# Patient Record
Sex: MALE | Race: WHITE | Employment: FULL TIME | URBAN - METROPOLITAN AREA
[De-identification: names, ages, dates, MRNs, and addresses within clinical notes are randomized per-mention and may not be internally consistent; named-entity substitution may affect disease eponyms.]

---

## 2019-09-29 ENCOUNTER — HOSPITAL ENCOUNTER (EMERGENCY)
Age: 64
Discharge: HOME | End: 2019-09-29
Attending: STUDENT IN AN ORGANIZED HEALTH CARE EDUCATION/TRAINING PROGRAM
Payer: COMMERCIAL

## 2019-09-29 VITALS
DIASTOLIC BLOOD PRESSURE: 81 MMHG | SYSTOLIC BLOOD PRESSURE: 154 MMHG | OXYGEN SATURATION: 98 % | HEART RATE: 79 BPM | RESPIRATION RATE: 16 BRPM | HEIGHT: 73 IN | WEIGHT: 229.06 LBS | BODY MASS INDEX: 30.36 KG/M2 | TEMPERATURE: 98 F

## 2019-09-29 DIAGNOSIS — S05.02XA ABRASION OF LEFT CORNEA, INITIAL ENCOUNTER: Primary | ICD-10-CM

## 2019-09-29 PROCEDURE — 74011000250 HC RX REV CODE- 250: Performed by: STUDENT IN AN ORGANIZED HEALTH CARE EDUCATION/TRAINING PROGRAM

## 2019-09-29 PROCEDURE — 99283 EMERGENCY DEPT VISIT LOW MDM: CPT

## 2019-09-29 RX ORDER — OXYCODONE AND ACETAMINOPHEN 5; 325 MG/1; MG/1
1 TABLET ORAL
Qty: 8 TAB | Refills: 0 | Status: SHIPPED | OUTPATIENT
Start: 2019-09-29 | End: 2019-10-02

## 2019-09-29 RX ORDER — TETRACAINE HYDROCHLORIDE 5 MG/ML
1 SOLUTION OPHTHALMIC
Status: COMPLETED | OUTPATIENT
Start: 2019-09-29 | End: 2019-09-29

## 2019-09-29 RX ORDER — TETRACAINE HYDROCHLORIDE 5 MG/ML
1 SOLUTION OPHTHALMIC
Status: DISCONTINUED | OUTPATIENT
Start: 2019-09-29 | End: 2019-09-29

## 2019-09-29 RX ORDER — ERYTHROMYCIN 5 MG/G
OINTMENT OPHTHALMIC
Qty: 1 TUBE | Refills: 0 | Status: SHIPPED | OUTPATIENT
Start: 2019-09-29 | End: 2019-10-06

## 2019-09-29 RX ORDER — PROPARACAINE HYDROCHLORIDE 5 MG/ML
1 SOLUTION/ DROPS OPHTHALMIC
Status: DISCONTINUED | OUTPATIENT
Start: 2019-09-29 | End: 2019-09-29

## 2019-09-29 RX ADMIN — TETRACAINE HYDROCHLORIDE 1 DROP: 5 SOLUTION OPHTHALMIC at 10:47

## 2019-09-29 RX ADMIN — FLUORESCEIN SODIUM 1 STRIP: 1 STRIP OPHTHALMIC at 10:46

## 2019-09-29 NOTE — ED NOTES
Patient was discharged and given instructions by Dr. Ana Lilia Rao. Patient verbalized good understanding of all discharge instructions, prescriptions and f/u care. All questions answered. Pt in stable condition on discharge.

## 2019-09-29 NOTE — ED PROVIDER NOTES
The history is provided by the patient and the spouse. Eye Pain    This is a new problem. The current episode started yesterday. The problem occurs constantly. The problem has not changed since onset. The left eye is affected. The injury mechanism was a foreign body (was mowing yard yesterday, thinks something got blown in his eye or scraped it). The pain is at a severity of 8/10. The pain is moderate. There is no history of trauma (none directly) to the eye. There is no known exposure to pink eye. He does not wear contacts. Associated symptoms include foreign body sensation, photophobia, eye redness and pain. Pertinent negatives include no decreased vision, no discharge, no double vision, no nausea, no vomiting, no fever and no blindness. He has tried eye drops for the symptoms. The treatment provided no relief. Past Medical History:   Diagnosis Date    Diabetes [facility])        Past Surgical History:   Procedure Laterality Date    HX KNEE ARTHROSCOPY Bilateral     HX MOHS PROCEDURES Right     LAP GASTR BYPASS INCL SMLL INT RECON  1/15/16         History reviewed. No pertinent family history. Social History     Socioeconomic History    Marital status:      Spouse name: Not on file    Number of children: Not on file    Years of education: Not on file    Highest education level: Not on file   Occupational History    Not on file   Social Needs    Financial resource strain: Not on file    Food insecurity:     Worry: Not on file     Inability: Not on file    Transportation needs:     Medical: Not on file     Non-medical: Not on file   Tobacco Use    Smoking status: Former Smoker     Last attempt to quit: 1/15/1996     Years since quittin.7   Substance and Sexual Activity    Alcohol use:  Yes     Alcohol/week: 6.0 standard drinks     Types: 6 Cans of beer per week    Drug use: Not on file    Sexual activity: Not on file   Lifestyle    Physical activity:     Days per week: Not on file Minutes per session: Not on file    Stress: Not on file   Relationships    Social connections:     Talks on phone: Not on file     Gets together: Not on file     Attends Jehovah's witness service: Not on file     Active member of club or organization: Not on file     Attends meetings of clubs or organizations: Not on file     Relationship status: Not on file    Intimate partner violence:     Fear of current or ex partner: Not on file     Emotionally abused: Not on file     Physically abused: Not on file     Forced sexual activity: Not on file   Other Topics Concern    Not on file   Social History Narrative    Not on file         ALLERGIES: Latex; Adhesive; Codeine; Demerol [meperidine]; Morphine; and Hydrocodone    Review of Systems   Constitutional: Negative for fever. HENT: Positive for congestion and rhinorrhea. Negative for ear pain and sore throat. Eyes: Positive for photophobia, pain, redness and visual disturbance. Negative for blindness, double vision and discharge. Respiratory: Negative for shortness of breath. Cardiovascular: Negative for chest pain. Gastrointestinal: Negative for nausea and vomiting. Neurological: Negative for headaches. All other systems reviewed and are negative. Vitals:    09/29/19 1019   BP: 154/81   Pulse: 79   Resp: 16   Temp: 98 °F (36.7 °C)   SpO2: 98%   Weight: 103.9 kg (229 lb 0.9 oz)   Height: 6' 1\" (1.854 m)            Physical Exam   Constitutional: He is oriented to person, place, and time. He appears well-developed. No distress. HENT:   Head: Normocephalic and atraumatic. Eyes: Pupils are equal, round, and reactive to light. EOM are normal. Lids are everted and swept, no foreign bodies found. Right eye exhibits no chemosis and no discharge. No foreign body present in the right eye. Left eye exhibits no chemosis and no discharge. No foreign body present in the left eye. Right conjunctiva is not injected. Right conjunctiva has no hemorrhage.  Left conjunctiva is injected. Left conjunctiva has no hemorrhage. Slit lamp exam:       The left eye shows corneal abrasion and fluorescein uptake. The left eye shows no corneal ulcer and no hyphema. Neck: Normal range of motion. Neck supple. Cardiovascular: Normal rate and normal heart sounds. Pulmonary/Chest: Effort normal. No respiratory distress. Musculoskeletal: Normal range of motion. He exhibits no edema. Neurological: He is alert and oriented to person, place, and time. He exhibits normal muscle tone. Skin: Skin is warm and dry. Vitals reviewed.        MDM       Procedures

## 2019-09-29 NOTE — ED TRIAGE NOTES
Triage note: Pt states he was working in the yard yesterday and noticed left eye pain more so when blinking. Reports photophobia. Denies a decrease in vision.  Has had a corneal abrasion in same eye in the past.

## 2019-09-29 NOTE — DISCHARGE INSTRUCTIONS
Patient Education        Corneal Scratches: Care Instructions  Your Care Instructions    The cornea is the clear surface that covers the front of the eye. When a speck of dirt, a wood chip, an insect, or another object flies into your eye, it can cause a painful scratch on the cornea. Wearing contact lenses too long or rubbing your eyes can also scratch the cornea. Small scratches usually heal in a day or two. Deeper scratches may take longer. If you have had a foreign object removed from your eye or you have a corneal scratch, you will need to watch for infection and vision problems while your eye heals. Follow-up care is a key part of your treatment and safety. Be sure to make and go to all appointments, and call your doctor if you are having problems. It's also a good idea to know your test results and keep a list of the medicines you take. How can you care for yourself at home? · The doctor probably used a medicine during your exam to numb your eye. When it wears off in 30 to 60 minutes, your eye pain may come back. Take pain medicines exactly as directed. ? If the doctor gave you a prescription medicine for pain, take it as prescribed. ? If you are not taking a prescription pain medicine, ask your doctor if you can take an over-the-counter medicine. ? Do not take two or more pain medicines at the same time unless the doctor told you to. Many pain medicines have acetaminophen, which is Tylenol. Too much acetaminophen (Tylenol) can be harmful. · Do not rub your injured eye. Rubbing can make it worse. · Use the prescribed eyedrops or ointment as directed. Be sure the dropper or bottle tip is clean. To put in eyedrops or ointment:  ? Tilt your head back, and pull your lower eyelid down with one finger. ? Drop or squirt the medicine inside the lower lid. ? Close your eye for 30 to 60 seconds to let the drops or ointment move around.   ? Do not touch the ointment or dropper tip to your eyelashes or any other surface. · Do not use your contact lens in your hurt eye until your doctor says you can. Also, do not wear eye makeup until your eye has healed. · Do not drive if you have blurred vision. · Bright light may hurt. Sunglasses can help. · To prevent eye injuries in the future, wear safety glasses or goggles when you work with machines or tools, mow the lawn, or ride a bike or motorcycle. When should you call for help? Call your doctor now or seek immediate medical care if:    · You have signs of an eye infection, such as:  ? Pus or thick discharge coming from the eye.  ? Redness or swelling around the eye.  ? A fever.     · You have new or worse eye pain.     · You have vision changes.     · It feels like there is something in your eye.     · Light hurts your eye.    Watch closely for changes in your health, and be sure to contact your doctor if:    · You do not get better as expected. Where can you learn more? Go to http://alison-nabor.info/. Enter Q021 in the search box to learn more about \"Corneal Scratches: Care Instructions. \"  Current as of: May 5, 2019  Content Version: 12.2  © 3770-0786 [employer]. Care instructions adapted under license by [employer] (which disclaims liability or warranty for this information). If you have questions about a medical condition or this instruction, always ask your healthcare professional. [place] [address] any warranty or liability for your use of this information.

## 2019-11-20 ENCOUNTER — HOSPITAL ENCOUNTER (EMERGENCY)
Age: 64
Discharge: HOME | End: 2019-11-20
Attending: EMERGENCY MEDICINE
Payer: COMMERCIAL

## 2019-11-20 ENCOUNTER — APPOINTMENT (OUTPATIENT)
Dept: GENERAL RADIOLOGY | Age: 64
End: 2019-11-20
Attending: EMERGENCY MEDICINE
Payer: COMMERCIAL

## 2019-11-20 VITALS
TEMPERATURE: 98.4 F | RESPIRATION RATE: 16 BRPM | BODY MASS INDEX: 30.74 KG/M2 | SYSTOLIC BLOOD PRESSURE: 146 MMHG | HEIGHT: 73 IN | WEIGHT: 231.92 LBS | HEART RATE: 72 BPM | DIASTOLIC BLOOD PRESSURE: 82 MMHG | OXYGEN SATURATION: 100 %

## 2019-11-20 DIAGNOSIS — M54.12 CERVICAL RADICULOPATHY: Primary | ICD-10-CM

## 2019-11-20 PROCEDURE — 96372 THER/PROPH/DIAG INJ SC/IM: CPT

## 2019-11-20 PROCEDURE — 99283 EMERGENCY DEPT VISIT LOW MDM: CPT

## 2019-11-20 PROCEDURE — 74011250637 HC RX REV CODE- 250/637: Performed by: EMERGENCY MEDICINE

## 2019-11-20 PROCEDURE — 72050 X-RAY EXAM NECK SPINE 4/5VWS: CPT

## 2019-11-20 PROCEDURE — 72040 X-RAY EXAM NECK SPINE 2-3 VW: CPT

## 2019-11-20 PROCEDURE — 74011250636 HC RX REV CODE- 250/636: Performed by: EMERGENCY MEDICINE

## 2019-11-20 RX ORDER — LOSARTAN POTASSIUM 50 MG/1
50 TABLET ORAL DAILY
COMMUNITY
End: 2022-11-01 | Stop reason: SDUPTHER

## 2019-11-20 RX ORDER — DIAZEPAM 5 MG/1
5 TABLET ORAL
Status: COMPLETED | OUTPATIENT
Start: 2019-11-20 | End: 2019-11-20

## 2019-11-20 RX ORDER — KETOROLAC TROMETHAMINE 30 MG/ML
30 INJECTION, SOLUTION INTRAMUSCULAR; INTRAVENOUS ONCE
Status: COMPLETED | OUTPATIENT
Start: 2019-11-20 | End: 2019-11-20

## 2019-11-20 RX ORDER — DIAZEPAM 5 MG/1
5 TABLET ORAL
Qty: 12 TAB | Refills: 0 | Status: SHIPPED | OUTPATIENT
Start: 2019-11-20 | End: 2022-10-18

## 2019-11-20 RX ORDER — NAPROXEN 500 MG/1
500 TABLET ORAL 2 TIMES DAILY WITH MEALS
Qty: 10 TAB | Refills: 0 | Status: SHIPPED | OUTPATIENT
Start: 2019-11-20 | End: 2019-11-25

## 2019-11-20 RX ADMIN — DIAZEPAM 5 MG: 5 TABLET ORAL at 19:09

## 2019-11-20 RX ADMIN — KETOROLAC TROMETHAMINE 30 MG: 30 INJECTION, SOLUTION INTRAMUSCULAR at 19:07

## 2019-11-20 NOTE — ED TRIAGE NOTES
TRIAGE NOTE: Patient arrived from home with c/o neck and LEFT shoulder pain that started a week ago. Denies any injury. Hx of DEBBIE.

## 2019-11-21 NOTE — DISCHARGE INSTRUCTIONS
The x-ray of your neck showed degenerative changes. His symptoms could be due to a pinched nerve. Use the muscle relaxer and Naprosyn (do not take other NSAIDs at the same time) with heating pad for relief. Please see Ortho Spine for further management. Thank you.

## 2019-11-21 NOTE — ED PROVIDER NOTES
Patient is a 59-year-old male with history of degenerative disease and C-spine who presents with left-sided neck pain radiating down to left shoulder. Patient reports pain worse at night with movement. Able to move left shoulder, not limited by pain. Reports pain started gradually and has worsened over the last week. Using heating pads to area along with Advil with minimal relief. No prior evaluation by spine surgery. Denies any numbness or weakness of bilateral upper extremities. Ambulating with steady gait in emergency department. Past Medical History:   Diagnosis Date    Diabetes ([address]     DJD (degenerative joint disease)        Past Surgical History:   Procedure Laterality Date    HX CHOLECYSTECTOMY      HX KNEE ARTHROSCOPY Bilateral     HX MOHS PROCEDURES Right     LAP GASTR BYPASS INCL SMLL INT RECON  1/15/16         History reviewed. No pertinent family history. Social History     Socioeconomic History    Marital status:      Spouse name: Not on file    Number of children: Not on file    Years of education: Not on file    Highest education level: Not on file   Occupational History    Not on file   Social Needs    Financial resource strain: Not on file    Food insecurity:     Worry: Not on file     Inability: Not on file    Transportation needs:     Medical: Not on file     Non-medical: Not on file   Tobacco Use    Smoking status: Former Smoker     Last attempt to quit: 1/15/1996     Years since quittin.8    Smokeless tobacco: Never Used   Substance and Sexual Activity    Alcohol use:  Yes     Alcohol/week: 6.0 standard drinks     Types: 6 Cans of beer per week    Drug use: Not on file    Sexual activity: Not on file   Lifestyle    Physical activity:     Days per week: Not on file     Minutes per session: Not on file    Stress: Not on file   Relationships    Social connections:     Talks on phone: Not on file     Gets together: Not on file     Attends Jain service: Not on file     Active member of club or organization: Not on file     Attends meetings of clubs or organizations: Not on file     Relationship status: Not on file    Intimate partner violence:     Fear of current or ex partner: Not on file     Emotionally abused: Not on file     Physically abused: Not on file     Forced sexual activity: Not on file   Other Topics Concern    Not on file   Social History Narrative    Not on file         ALLERGIES: Latex; Adhesive; Codeine; Demerol [meperidine]; Morphine; and Hydrocodone    Review of Systems   Constitutional: Negative for chills and fever. HENT: Negative for drooling and nosebleeds. Eyes: Negative for pain and itching. Respiratory: Negative for choking and stridor. Cardiovascular: Negative for leg swelling. Gastrointestinal: Negative for abdominal pain and rectal pain. Endocrine: Negative for heat intolerance and polyphagia. Genitourinary: Negative for enuresis and genital sores. Musculoskeletal: Negative for arthralgias and joint swelling. Skin: Negative for color change. Allergic/Immunologic: Negative for immunocompromised state. Neurological: Negative for tremors, speech difficulty, light-headedness, numbness and headaches. Hematological: Negative for adenopathy. Psychiatric/Behavioral: Negative for dysphoric mood and sleep disturbance. Vitals:    11/20/19 1741   BP: 146/82   Pulse: 72   Resp: 16   Temp: 98.4 °F (36.9 °C)   SpO2: 100%   Weight: 105.2 kg (231 lb 14.8 oz)   Height: 6' 1\" (1.854 m)            Physical Exam  Vitals signs and nursing note reviewed. Constitutional:       Appearance: He is well-developed. HENT:      Head: Normocephalic. Nose: Nose normal.   Eyes:      Conjunctiva/sclera: Conjunctivae normal.   Neck:      Musculoskeletal: Normal range of motion and neck supple. No neck rigidity.       Comments: Mild tenderness to palpation along left lateral neck extending to left trapezius to shoulder. No C-spine vertebral tenderness to palpation. Cardiovascular:      Rate and Rhythm: Normal rate and regular rhythm. Heart sounds: Normal heart sounds. Pulmonary:      Effort: Pulmonary effort is normal. No respiratory distress. Breath sounds: Normal breath sounds. Abdominal:      General: There is no distension. Palpations: Abdomen is soft. Musculoskeletal: Normal range of motion. General: No deformity. Comments: Range of motion of left shoulder not limited by pain. Lymphadenopathy:      Cervical: No cervical adenopathy. Skin:     General: Skin is warm and dry. Neurological:      Mental Status: He is alert. Coordination: Coordination normal.   Psychiatric:         Behavior: Behavior normal.          MDM  Number of Diagnoses or Management Options  Diagnosis management comments: History of degenerative disease in the neck. History and exam consistent with radiculopathy. No numbness, weakness. Will treat with trial of muscle relaxers and NSAIDs. Patient to follow-up with orthospine for further management. Stable for discharge. Procedures    Patient's results have been reviewed with them. Patient and/or family have verbally conveyed their understanding and agreement of the patient's signs, symptoms, diagnosis, treatment and prognosis and additionally agree to follow up as recommended or return to the Emergency Room should their condition change prior to follow-up. Discharge instructions have also been provided to the patient with some educational information regarding their diagnosis as well a list of reasons why they would want to return to the ER prior to their follow-up appointment should their condition change.

## 2022-10-18 ENCOUNTER — OFFICE VISIT (OUTPATIENT)
Dept: INTERNAL MEDICINE CLINIC | Age: 67
End: 2022-10-18
Payer: COMMERCIAL

## 2022-10-18 VITALS
DIASTOLIC BLOOD PRESSURE: 75 MMHG | RESPIRATION RATE: 20 BRPM | BODY MASS INDEX: 28.12 KG/M2 | HEART RATE: 68 BPM | TEMPERATURE: 98.3 F | WEIGHT: 212.2 LBS | OXYGEN SATURATION: 97 % | HEIGHT: 73 IN | SYSTOLIC BLOOD PRESSURE: 133 MMHG

## 2022-10-18 DIAGNOSIS — I10 PRIMARY HYPERTENSION: ICD-10-CM

## 2022-10-18 DIAGNOSIS — M19.90 ARTHRITIS: ICD-10-CM

## 2022-10-18 DIAGNOSIS — Z12.5 PROSTATE CANCER SCREENING: ICD-10-CM

## 2022-10-18 DIAGNOSIS — E78.2 MIXED HYPERLIPIDEMIA: ICD-10-CM

## 2022-10-18 DIAGNOSIS — E11.9 CONTROLLED TYPE 2 DIABETES MELLITUS WITHOUT COMPLICATION, WITHOUT LONG-TERM CURRENT USE OF INSULIN (HCC): Primary | ICD-10-CM

## 2022-10-18 DIAGNOSIS — B02.29 POSTHERPETIC NEURALGIA: ICD-10-CM

## 2022-10-18 DIAGNOSIS — K21.9 GASTROESOPHAGEAL REFLUX DISEASE WITHOUT ESOPHAGITIS: ICD-10-CM

## 2022-10-18 DIAGNOSIS — Z11.59 ENCOUNTER FOR HEPATITIS C SCREENING TEST FOR LOW RISK PATIENT: ICD-10-CM

## 2022-10-18 PROCEDURE — 99204 OFFICE O/P NEW MOD 45 MIN: CPT | Performed by: STUDENT IN AN ORGANIZED HEALTH CARE EDUCATION/TRAINING PROGRAM

## 2022-10-18 RX ORDER — CELECOXIB 200 MG/1
200 CAPSULE ORAL
Qty: 90 CAPSULE | Refills: 1 | Status: SHIPPED | OUTPATIENT
Start: 2022-10-18 | End: 2022-11-01

## 2022-10-18 RX ORDER — KETOCONAZOLE 20 MG/ML
SHAMPOO TOPICAL
COMMUNITY
Start: 2022-08-31

## 2022-10-18 RX ORDER — GABAPENTIN 300 MG/1
CAPSULE ORAL
COMMUNITY
Start: 2022-08-14 | End: 2022-11-01 | Stop reason: SDUPTHER

## 2022-10-18 RX ORDER — GLIPIZIDE 5 MG/1
5 TABLET, FILM COATED, EXTENDED RELEASE ORAL DAILY
COMMUNITY
Start: 2022-07-25 | End: 2022-11-01

## 2022-10-18 NOTE — PATIENT INSTRUCTIONS
Please obtain the following vaccines from your local pharmacy. Please ask your pharmacy to fax our office a copy of the vaccination record. Our fax number is [phone].    - Bivalent COVID booster  - Tetanus booster

## 2022-10-18 NOTE — ASSESSMENT & PLAN NOTE
Check A1c to assess control. Cont glipizide and metformin. May be to use GLP1 or SGLT2 if needed for better control or if hypoglycemia develops. Requested a copy of his last eye exam. Check microalbumin today. Cont simvastatin. Foot exam next visit.

## 2022-10-18 NOTE — PROGRESS NOTES
Ariana Yang is a 74y.o. year old male who is a new patient to me today (10/18/22). He was previous followed by Dr Ben Swan. Assessment & Plan:   1. Controlled type 2 diabetes mellitus without complication, without long-term current use of insulin (HCC)  Assessment & Plan:   Check A1c to assess control. Cont glipizide and metformin. May be to use GLP1 or SGLT2 if needed for better control or if hypoglycemia develops. Requested a copy of his last eye exam. Check microalbumin today. Cont simvastatin. Foot exam next visit. Orders:  -     HEMOGLOBIN A1C WITH EAG; Future  -     CBC WITH AUTOMATED DIFF; Future  -     METABOLIC PANEL, COMPREHENSIVE; Future  -     LIPID PANEL; Future  -     MICROALBUMIN, UR, RAND W/ MICROALB/CREAT RATIO; Future  2. Primary hypertension  Assessment & Plan:   Controlled, continue losartan. 3. Mixed hyperlipidemia  Assessment & Plan:   Check lipid panel, cont simvastatin. Also on omega 3. Orders:  -     CBC WITH AUTOMATED DIFF; Future  -     METABOLIC PANEL, COMPREHENSIVE; Future  -     LIPID PANEL; Future  4. Postherpetic neuralgia  Assessment & Plan:   Controlled, cont gabapentin  5. Gastroesophageal reflux disease without esophagitis  Assessment & Plan:   Controlled, cont pantoprazole     6. Arthritis  Assessment & Plan:   Cont celebrex as long as renal function normal. Cont follow with orthopedist.  Orders:  -     celecoxib (CELEBREX) 200 mg capsule; Take 1 Capsule by mouth daily as needed for Pain., Normal, Disp-90 Capsule, R-1  7. Encounter for hepatitis C screening test for low risk patient  -     HEPATITIS C AB; Future  8. Prostate cancer screening  -     PSA SCREENING (SCREENING);  Future       Health Maintenance   Flu vaccine: 9/2022   COVID vaccine: discussed bivalent booster   Tetanus vaccine: due for booster   Shingles vaccine: up to date  Pneumonia vaccine: up to date   Colon cancer screening: scheduled for 12/2/22 not sure of MD's name   PSA: will check today AAA screening: DUE will order next visit  Lung cancer screening: does not qualify based on quit date  Hep C: will check today   Lipid: will check today   DM: will check today   Healthcare decision maker: wife  ACP:      RTC: 6 mo DM, foot exam, aaa screening    Subjective:   Ceasar Robins was seen today for Establish Care    DM  Dx 15 years ago   Meds: Glipizide 5mg daily, metformin 1g BID  Checks BG at home periodically, notes AM fasting BG as high as 190 and it is better in the afternoon. Diet focuses on protein and veggies  Eye: last exam earlier this year, denies hx retinopathy   Foot: no dx neuropathy in his feet. MicroalbuminCr: check today   Statin: simvastatin 26RT, omega 3  Complications: No MI or CVA    HTN - taking losartan 50mg, doesn't check at home     GERD - pantoprazole, no trouble swallowing     Shingles - in R V1 distribution, eye was effected. This was about 10 years ago. He has been on gabapentin since then, states every time he goes off he has pain and must restart the medication    Allergies - Mometasone nasal spray PRN    Arthritis - hips, back, hands. Dr Sammy Caceres orthopedist is treated him. He is taking celebrex and request a refill. Review of Systems   All other systems reviewed and are negative. PMHx    Patient Active Problem List   Diagnosis Code    Controlled type 2 diabetes mellitus without complication, without long-term current use of insulin ([facility] [address] E11.9    Arthritis M19.90    Mixed hyperlipidemia E78.2    Primary hypertension I10    Gastroesophageal reflux disease without esophagitis K21.9    Postherpetic neuralgia B02.29       Past Medical History:   Diagnosis Date    Diabetes ([address]     DJD (degenerative joint disease)     GERD (gastroesophageal reflux disease)     HTN (hypertension)     Shingles        Prior to Admission medications    Medication Sig Start Date End Date Taking?  Authorizing Provider   gabapentin (NEURONTIN) 300 mg capsule TAKE 2 CAPSULES BY MOUTH AT BEDTIME 90 8/14/22  Yes Provider, Historical   glipiZIDE SR (GLUCOTROL XL) 5 mg CR tablet Take 5 mg by mouth daily. 7/25/22  Yes Provider, Historical   ketoconazole (NIZORAL) 2 % shampoo 1 (ONE) APPLICATION MASSAGE INTO SCALP, LET SIT FOR 5 MINUTES, AND RINSE 8/31/22  Yes Provider, Historical   celecoxib (CELEBREX) 200 mg capsule Take 1 Capsule by mouth daily as needed for Pain. 10/18/22  Yes Philip Shen MD   losartan (COZAAR) 50 mg tablet Take 50 mg by mouth daily. Yes Other, MD Hayes   mometasone (NASONEX) 50 mcg/actuation nasal spray 2 Sprays daily as needed. Yes Provider, Historical   multivitamin, tx-iron-ca-min (THERA-M W/ IRON) 9 mg iron-400 mcg tab tablet Take 1 Tab by mouth daily. Yes Provider, Historical   omega-3 fatty acids-vitamin e 1,000 mg cap Take 2 Caps by mouth daily. Yes Provider, Historical   simvastatin (ZOCOR) 40 mg tablet Take 40 mg by mouth nightly. Yes Provider, Historical   aspirin delayed-release 81 mg tablet Take 81 mg by mouth daily. Yes Provider, Historical   pantoprazole (PROTONIX) 40 mg tablet Take 40 mg by mouth daily. Yes Provider, Historical   metFORMIN (GLUCOPHAGE) 500 mg tablet Take 1,000 mg by mouth two (2) times daily (with meals). Yes Provider, Historical   diazePAM (VALIUM) 5 mg tablet Take 1 Tab by mouth every eight (8) hours as needed for Anxiety. Max Daily Amount: 15 mg. 11/20/19 10/18/22  Lisa Ashley MD   celecoxib (CELEBREX) 200 mg capsule Take  by mouth daily. 10/18/22  Provider, Historical   amLODIPine (NORVASC) 5 mg tablet Take 1 Tab by mouth daily. Patient not taking: Reported on 10/18/2022 1/16/16 10/18/22  Magda Bahena MD   losartan (COZAAR) 25 mg tablet Take 25 mg by mouth daily.   10/18/22  Provider, Historical       PSHx    Past Surgical History:   Procedure Laterality Date    HX CHOLECYSTECTOMY  2012    gall stone pancreatitis    HX KNEE ARTHROSCOPY Bilateral     HX MOHS PROCEDURES Right     HX ROTATOR CUFF REPAIR Right 2002 FH    Family History   Problem Relation Age of Onset    Irregular heart beat Mother     Heart Failure Mother     Leukemia Father     Diabetes Brother         insulin dependent, blind    Diabetes Brother     Obesity Brother     No Known Problems Son     No Known Problems Son     Colon Cancer Neg Hx     Prostate Cancer Neg Hx         SH    Social History     Occupational History    Occupation: Marketing for [employer]   Tobacco Use    Smoking status: Former     Packs/day: 1.00     Years: 20.00     Pack years: 20.00     Types: Cigarettes     Quit date: 1/15/1996     Years since quittin.7    Smokeless tobacco: Never   Substance and Sexual Activity    Alcohol use: Yes     Alcohol/week: 6.0 standard drinks     Types: 6 Cans of beer per week     Comment: 2-3 x/week, 2 drinks at a time    Drug use: Never    Sexual activity: Not on file        The following sections were reviewed & updated as appropriate: Problem List, Allergies, PMH, PSH, FH, and SH. Objective:   Visit Vitals  /75   Pulse 68   Temp 98.3 °F (36.8 °C)   Resp 20   Ht 6' 1\" (1.854 m)   Wt 212 lb 3.2 oz (96.3 kg)   SpO2 97%   BMI 28.00 kg/m²       Physical Exam  Constitutional:       General: He is not in acute distress. Eyes:      Extraocular Movements: Extraocular movements intact. Conjunctiva/sclera: Conjunctivae normal.   Cardiovascular:      Rate and Rhythm: Normal rate and regular rhythm. Heart sounds: No murmur heard. Pulmonary:      Effort: Pulmonary effort is normal. No respiratory distress. Abdominal:      General: Bowel sounds are normal.      Palpations: Abdomen is soft. Musculoskeletal:      Right lower leg: No edema. Left lower leg: No edema. Neurological:      General: No focal deficit present. Mental Status: He is alert.    Psychiatric:         Mood and Affect: Mood normal.         Behavior: Behavior normal.            Crystal Siddiqui MD

## 2022-10-19 LAB
ALBUMIN SERPL-MCNC: 5 G/DL (ref 3.8–4.8)
ALBUMIN/CREAT UR: 4 MG/G CREAT (ref 0–29)
ALBUMIN/GLOB SERPL: 2.3 {RATIO} (ref 1.2–2.2)
ALP SERPL-CCNC: 71 IU/L (ref 44–121)
ALT SERPL-CCNC: 26 IU/L (ref 0–44)
AST SERPL-CCNC: 21 IU/L (ref 0–40)
BASOPHILS # BLD AUTO: 0.1 X10E3/UL (ref 0–0.2)
BASOPHILS NFR BLD AUTO: 1 %
BILIRUB SERPL-MCNC: 0.4 MG/DL (ref 0–1.2)
BUN SERPL-MCNC: 27 MG/DL (ref 8–27)
BUN/CREAT SERPL: 20 (ref 10–24)
CALCIUM SERPL-MCNC: 9.7 MG/DL (ref 8.6–10.2)
CHLORIDE SERPL-SCNC: 99 MMOL/L (ref 96–106)
CHOLEST SERPL-MCNC: 166 MG/DL (ref 100–199)
CO2 SERPL-SCNC: 23 MMOL/L (ref 20–29)
CREAT SERPL-MCNC: 1.34 MG/DL (ref 0.76–1.27)
CREAT UR-MCNC: 108.9 MG/DL
EGFR: 59 ML/MIN/1.73
EOSINOPHIL # BLD AUTO: 0.3 X10E3/UL (ref 0–0.4)
EOSINOPHIL NFR BLD AUTO: 4 %
ERYTHROCYTE [DISTWIDTH] IN BLOOD BY AUTOMATED COUNT: 13.1 % (ref 11.6–15.4)
EST. AVERAGE GLUCOSE BLD GHB EST-MCNC: 163 MG/DL
GLOBULIN SER CALC-MCNC: 2.2 G/DL (ref 1.5–4.5)
GLUCOSE SERPL-MCNC: 147 MG/DL (ref 70–99)
HBA1C MFR BLD: 7.3 % (ref 4.8–5.6)
HCT VFR BLD AUTO: 48.1 % (ref 37.5–51)
HCV AB S/CO SERPL IA: <0.1 S/CO RATIO (ref 0–0.9)
HDLC SERPL-MCNC: 53 MG/DL
HGB BLD-MCNC: 16 G/DL (ref 13–17.7)
IMM GRANULOCYTES # BLD AUTO: 0 X10E3/UL (ref 0–0.1)
IMM GRANULOCYTES NFR BLD AUTO: 0 %
LDLC SERPL CALC-MCNC: 88 MG/DL (ref 0–99)
LYMPHOCYTES # BLD AUTO: 1.7 X10E3/UL (ref 0.7–3.1)
LYMPHOCYTES NFR BLD AUTO: 23 %
MCH RBC QN AUTO: 29.6 PG (ref 26.6–33)
MCHC RBC AUTO-ENTMCNC: 33.3 G/DL (ref 31.5–35.7)
MCV RBC AUTO: 89 FL (ref 79–97)
MICROALBUMIN UR-MCNC: 4.5 UG/ML
MONOCYTES # BLD AUTO: 0.7 X10E3/UL (ref 0.1–0.9)
MONOCYTES NFR BLD AUTO: 9 %
NEUTROPHILS # BLD AUTO: 4.7 X10E3/UL (ref 1.4–7)
NEUTROPHILS NFR BLD AUTO: 63 %
PLATELET # BLD AUTO: 191 X10E3/UL (ref 150–450)
POTASSIUM SERPL-SCNC: 5.1 MMOL/L (ref 3.5–5.2)
PROT SERPL-MCNC: 7.2 G/DL (ref 6–8.5)
PSA SERPL-MCNC: 1 NG/ML (ref 0–4)
RBC # BLD AUTO: 5.41 X10E6/UL (ref 4.14–5.8)
SODIUM SERPL-SCNC: 137 MMOL/L (ref 134–144)
TRIGL SERPL-MCNC: 143 MG/DL (ref 0–149)
VLDLC SERPL CALC-MCNC: 25 MG/DL (ref 5–40)
WBC # BLD AUTO: 7.4 X10E3/UL (ref 3.4–10.8)

## 2022-10-20 ENCOUNTER — TELEPHONE (OUTPATIENT)
Dept: INTERNAL MEDICINE CLINIC | Age: 67
End: 2022-10-20

## 2022-10-20 DIAGNOSIS — E11.9 CONTROLLED TYPE 2 DIABETES MELLITUS WITHOUT COMPLICATION, WITHOUT LONG-TERM CURRENT USE OF INSULIN (HCC): Primary | ICD-10-CM

## 2022-10-20 RX ORDER — DICLOFENAC SODIUM 10 MG/G
2 GEL TOPICAL
Qty: 100 G | Refills: 2 | Status: SHIPPED | OUTPATIENT
Start: 2022-10-20

## 2022-10-20 RX ORDER — SEMAGLUTIDE 1.34 MG/ML
0.25 INJECTION, SOLUTION SUBCUTANEOUS
Qty: 1 BOX | Refills: 0 | Status: SHIPPED | OUTPATIENT
Start: 2022-10-20 | End: 2022-11-17

## 2022-10-20 NOTE — PROGRESS NOTES
See telephone note - A1c high, will start ozempic. He does have hx pancreatitis related to gallstones and has had cholecystectomy. CKD3, needs to stop celebrex, trial of topical diclof and should speak to ortho for additional mgmt options.

## 2022-10-20 NOTE — TELEPHONE ENCOUNTER
Called pt to discuss recent labs work -   A1c up 7.3% - will start ozempic. He will call if med is not covered by insurance. He will review video on ozempic website and let me know if he wants to meet with a pharmacy. Reviewed side effect of N/V. Also discussed renal function - labs show CKD3. He was not aware of this. I advised him he should not use celebrex anymore. He should see his orthopedist for alternatives. Will send in topical diclofenac for him to try.      Willie Marie MD

## 2022-10-31 ENCOUNTER — TELEPHONE (OUTPATIENT)
Dept: INTERNAL MEDICINE CLINIC | Age: 67
End: 2022-10-31

## 2022-11-01 ENCOUNTER — OFFICE VISIT (OUTPATIENT)
Dept: INTERNAL MEDICINE CLINIC | Age: 67
End: 2022-11-01
Payer: COMMERCIAL

## 2022-11-01 VITALS
TEMPERATURE: 97.5 F | HEIGHT: 73 IN | DIASTOLIC BLOOD PRESSURE: 76 MMHG | HEART RATE: 74 BPM | SYSTOLIC BLOOD PRESSURE: 127 MMHG | BODY MASS INDEX: 28.1 KG/M2 | RESPIRATION RATE: 20 BRPM | OXYGEN SATURATION: 97 % | WEIGHT: 212 LBS

## 2022-11-01 DIAGNOSIS — K21.9 GASTROESOPHAGEAL REFLUX DISEASE WITHOUT ESOPHAGITIS: ICD-10-CM

## 2022-11-01 DIAGNOSIS — M19.90 ARTHRITIS: Primary | ICD-10-CM

## 2022-11-01 DIAGNOSIS — E11.9 CONTROLLED TYPE 2 DIABETES MELLITUS WITHOUT COMPLICATION, WITHOUT LONG-TERM CURRENT USE OF INSULIN (HCC): ICD-10-CM

## 2022-11-01 DIAGNOSIS — Z13.6 ENCOUNTER FOR ABDOMINAL AORTIC ANEURYSM (AAA) SCREENING: ICD-10-CM

## 2022-11-01 DIAGNOSIS — I10 PRIMARY HYPERTENSION: ICD-10-CM

## 2022-11-01 PROCEDURE — 99214 OFFICE O/P EST MOD 30 MIN: CPT | Performed by: STUDENT IN AN ORGANIZED HEALTH CARE EDUCATION/TRAINING PROGRAM

## 2022-11-01 RX ORDER — PANTOPRAZOLE SODIUM 40 MG/1
40 TABLET, DELAYED RELEASE ORAL DAILY
Qty: 90 TABLET | Refills: 3 | Status: SHIPPED | OUTPATIENT
Start: 2022-11-01

## 2022-11-01 RX ORDER — SEMAGLUTIDE 1.34 MG/ML
0.5 INJECTION, SOLUTION SUBCUTANEOUS
Qty: 3 PEN | Refills: 1 | Status: SHIPPED | OUTPATIENT
Start: 2022-11-01

## 2022-11-01 RX ORDER — GABAPENTIN 300 MG/1
600 CAPSULE ORAL
Qty: 180 CAPSULE | Refills: 3 | Status: SHIPPED | OUTPATIENT
Start: 2022-11-01

## 2022-11-01 RX ORDER — SIMVASTATIN 40 MG/1
40 TABLET, FILM COATED ORAL
Qty: 90 TABLET | Refills: 3 | Status: SHIPPED | OUTPATIENT
Start: 2022-11-01

## 2022-11-01 RX ORDER — LOSARTAN POTASSIUM 50 MG/1
50 TABLET ORAL DAILY
Qty: 90 TABLET | Refills: 3 | Status: SHIPPED | OUTPATIENT
Start: 2022-11-01

## 2022-11-01 RX ORDER — METFORMIN HYDROCHLORIDE 500 MG/1
1000 TABLET ORAL 2 TIMES DAILY WITH MEALS
Qty: 360 TABLET | Refills: 3 | Status: SHIPPED | OUTPATIENT
Start: 2022-11-01

## 2022-11-01 NOTE — ASSESSMENT & PLAN NOTE
Uncontrolled, A1c 7.2%. He is tolerating 0.25mg ozempic, will increase to 0.5mg in 2 weeks. Script sent. He stopped glipizide, I think this is OK because we will up titrate the ozempic to meet his A1C goal <7. Reviewed his hx pancreatitis and med side effect of pancreatitis, he will monitor for abdominal pain on the medication.  RTC 3 mo for A1c check

## 2022-11-01 NOTE — PROGRESS NOTES
Ziyad Moncada is a 74y.o. year old male who presents today (11/01/22) for follow-up. Assessment & Plan:   1. Arthritis  Assessment & Plan:  Hips and back ache. He has no formal diagnosis for his pains besides arthritis, has not had any imaging. Discussed use of all NSAIDS is contraindicated with CKD, but short term use can be reasonable while he establishes with ortho for further management since he is not getting any relief from tylenol 1g TID PRN or topical diclofenac. Referral for ortho given, he is already established and [facility]. Orders:  -     REFERRAL TO ORTHOPEDIC SURGERY  -     gabapentin (NEURONTIN) 300 mg capsule; Take 2 Capsules by mouth nightly. Max Daily Amount: 600 mg., Normal, Disp-180 Capsule, R-3  2. Controlled type 2 diabetes mellitus without complication, without long-term current use of insulin ([facility])  Assessment & Plan:  Uncontrolled, A1c 7.2%. He is tolerating 0.25mg ozempic, will increase to 0.5mg in 2 weeks. Script sent. He stopped glipizide, I think this is OK because we will up titrate the ozempic to meet his A1C goal <7. Reviewed his hx pancreatitis and med side effect of pancreatitis, he will monitor for abdominal pain on the medication. RTC 3 mo for A1c check   Orders:  -     semaglutide (Ozempic) 0.25 mg or 0.5 mg/dose (2 mg/1.5 ml) subq pen; 0.5 mg by SubCUTAneous route every seven (7) days. , Normal, Disp-3 Pen, R-1  -     metFORMIN (GLUCOPHAGE) 500 mg tablet; Take 2 Tablets by mouth two (2) times daily (with meals). , Normal, Disp-360 Tablet, R-3  -     simvastatin (ZOCOR) 40 mg tablet; Take 1 Tablet by mouth nightly., Normal, Disp-90 Tablet, R-3  -     HEMOGLOBIN A1C WITH EAG; Future  3. Primary hypertension  -     losartan (COZAAR) 50 mg tablet; Take 1 Tablet by mouth daily. , Normal, Disp-90 Tablet, R-3  4. Gastroesophageal reflux disease without esophagitis  -     pantoprazole (PROTONIX) 40 mg tablet; Take 1 Tablet by mouth daily. , Normal, Disp-90 Tablet, R-3  5.  Encounter for abdominal aortic aneurysm (AAA) screening  -     US EXAM SCREENING AAA; Future       Health Maintenance   Flu vaccine: 9/2022   COVID vaccine: discussed bivalent booster   Tetanus vaccine: due for booster   Shingles vaccine: up to date  Pneumonia vaccine: up to date   Colon cancer screening: scheduled for 12/2/22 not sure of MD's name   PSA: 10/2022 1.0  AAA screening: check today   Lung cancer screening: does not qualify based on quit date  Hep C:  10/2022  Lipid: 10/2022  DM: 10/2022  Healthcare decision maker: wife  ACP:    RTC: 3 mo for DM, foot exam    Subjective:   Lida Villagran was seen today for Medication Evaluation    Fell on Friday, broke the fall with the palm of his L hand. His leg gave out, causing the fall. Has a bruise on the hand that is improving. Pain is minimal     Arthritis - pain worse since he stopped celebrex. Hips hurts the worse, back as well. He tried topical diclofenac and it is not helpful. Tylenol is not helpful either. He is taking advil for the pain now. Ozempic - picked it up from the pharmacy, it was $40 for the month supply with the coupon. Has taken it for 2 weeks with no issues. He stopped taking glipizide. Check BG in AM, it is mid 100s. Review of Systems   All other systems reviewed and are negative. PMHx    Patient Active Problem List   Diagnosis Code    Controlled type 2 diabetes mellitus without complication, without long-term current use of insulin ([facility] [address] E11.9    Arthritis M19.90    Mixed hyperlipidemia E78.2    Primary hypertension I10    Gastroesophageal reflux disease without esophagitis K21.9    Postherpetic neuralgia B02.29       Prior to Admission medications    Medication Sig Start Date End Date Taking? Authorizing Provider   semaglutide (Ozempic) 0.25 mg or 0.5 mg/dose (2 mg/1.5 ml) subq pen 0.5 mg by SubCUTAneous route every seven (7) days. 11/1/22  Yes Liliya Mendieta MD   gabapentin (NEURONTIN) 300 mg capsule Take 2 Capsules by mouth nightly.  Max Daily Amount: 600 mg. 11/1/22  Yes Soniya Garcia MD   losartan (COZAAR) 50 mg tablet Take 1 Tablet by mouth daily. 11/1/22  Yes Soniya Garcia MD   metFORMIN (GLUCOPHAGE) 500 mg tablet Take 2 Tablets by mouth two (2) times daily (with meals). 11/1/22  Yes Soniya Garcia MD   pantoprazole (PROTONIX) 40 mg tablet Take 1 Tablet by mouth daily. 11/1/22  Yes Soniya Garcia MD   simvastatin (ZOCOR) 40 mg tablet Take 1 Tablet by mouth nightly. 11/1/22  Yes Soniya Garcia MD   semaglutide (Ozempic) 0.25 mg or 0.5 mg/dose (2 mg/1.5 ml) subq pen 0.25 mg by SubCUTAneous route every seven (7) days for 28 days. Call clinic for refill and dose adjustment. 10/20/22 11/17/22 Yes Soniya Garcia MD   diclofenac (VOLTAREN) 1 % gel Apply 2 g to affected area four (4) times daily as needed for Pain. For hand pain. 10/20/22  Yes Soniya Garcia MD   ketoconazole (NIZORAL) 2 % shampoo 1 (ONE) APPLICATION MASSAGE INTO SCALP, LET SIT FOR 5 MINUTES, AND RINSE 8/31/22  Yes Provider, Historical   mometasone (NASONEX) 50 mcg/actuation nasal spray 2 Sprays daily as needed. Yes Provider, Historical   multivitamin, tx-iron-ca-min (THERA-M W/ IRON) 9 mg iron-400 mcg tab tablet Take 1 Tab by mouth daily. Yes Provider, Historical   omega-3 fatty acids-vitamin e 1,000 mg cap Take 2 Caps by mouth daily. Yes Provider, Historical   aspirin delayed-release 81 mg tablet Take 81 mg by mouth daily. Yes Provider, Historical   gabapentin (NEURONTIN) 300 mg capsule TAKE 2 CAPSULES BY MOUTH AT BEDTIME 90 8/14/22 11/1/22  Provider, Historical   glipiZIDE SR (GLUCOTROL XL) 5 mg CR tablet Take 5 mg by mouth daily. 7/25/22 11/1/22  Provider, Historical   celecoxib (CELEBREX) 200 mg capsule Take 1 Capsule by mouth daily as needed for Pain. 10/18/22 11/1/22  Soniya Garcia MD   losartan (COZAAR) 50 mg tablet Take 50 mg by mouth daily.   11/1/22  Other, MD Hayes   simvastatin (ZOCOR) 40 mg tablet Take 40 mg by mouth nightly. 11/1/22  Provider, Historical   pantoprazole (PROTONIX) 40 mg tablet Take 40 mg by mouth daily. 11/1/22  Provider, Historical   metFORMIN (GLUCOPHAGE) 500 mg tablet Take 1,000 mg by mouth two (2) times daily (with meals). 11/1/22  Provider, Historical       The following sections were reviewed & updated as appropriate: Problem List, Allergies, PMH, PSH, FH, and SH. Objective:   Visit Vitals  /76   Pulse 74   Temp 97.5 °F (36.4 °C) (Temporal)   Resp 20   Ht 6' 1\" (1.854 m)   Wt 212 lb (96.2 kg)   SpO2 97%   BMI 27.97 kg/m²       Physical Exam  Constitutional:       General: He is not in acute distress. Eyes:      Extraocular Movements: Extraocular movements intact. Conjunctiva/sclera: Conjunctivae normal.   Cardiovascular:      Rate and Rhythm: Normal rate and regular rhythm. Heart sounds: No murmur heard. Pulmonary:      Effort: Pulmonary effort is normal. No respiratory distress. Abdominal:      General: Bowel sounds are normal.      Palpations: Abdomen is soft. Musculoskeletal:      Right lower leg: No edema. Left lower leg: No edema. Skin:     Comments: Bruise on heel of L palm. No tenderness or reduced ROM of wrist or hand   Neurological:      General: No focal deficit present. Mental Status: He is alert.    Psychiatric:         Mood and Affect: Mood normal.         Behavior: Behavior normal.            Odessa Beal MD

## 2022-11-01 NOTE — ASSESSMENT & PLAN NOTE
Hips and back ache. He has no formal diagnosis for his pains besides arthritis, has not had any imaging. Discussed use of all NSAIDS is contraindicated with CKD, but short term use can be reasonable while he establishes with ortho for further management since he is not getting any relief from tylenol 1g TID PRN or topical diclofenac. Referral for ortho given, he is already established and [facility].

## 2022-11-01 NOTE — PATIENT INSTRUCTIONS
1000mg tylenol every 8 hours up to 3 times a day for pain  If that is not helping, use NSAIDS sparingly - alleve, ibuprofen, celebrex, etc.

## 2022-11-29 ENCOUNTER — OFFICE VISIT (OUTPATIENT)
Dept: INTERNAL MEDICINE CLINIC | Age: 67
End: 2022-11-29

## 2022-11-29 VITALS
TEMPERATURE: 97.3 F | HEART RATE: 76 BPM | OXYGEN SATURATION: 97 % | DIASTOLIC BLOOD PRESSURE: 60 MMHG | RESPIRATION RATE: 16 BRPM | HEIGHT: 73 IN | WEIGHT: 210.4 LBS | BODY MASS INDEX: 27.89 KG/M2 | SYSTOLIC BLOOD PRESSURE: 110 MMHG

## 2022-11-29 DIAGNOSIS — S90.852A FOREIGN BODY OF LEFT HEEL: Primary | ICD-10-CM

## 2022-11-29 PROCEDURE — 99212 OFFICE O/P EST SF 10 MIN: CPT | Performed by: NURSE PRACTITIONER

## 2022-11-29 NOTE — PROGRESS NOTES
HISTORY OF PRESENT ILLNESS  James Villela is a 74 y.o. male. Patient presents for splinter in left heel for a couple days. He was walking on wood floors when he got the splinter. He notes pain with walking. No significant redness or swelling. No drainage. No warmth. He removed part of the splinter, but feels there is still a piece in his heel. Visit Vitals  /60 (BP 1 Location: Right arm, BP Patient Position: Sitting, BP Cuff Size: Adult)   Pulse 76   Temp 97.3 °F (36.3 °C) (Temporal)   Resp 16   Ht 6' 1\" (1.854 m)   Wt 210 lb 6.4 oz (95.4 kg)   SpO2 97%   BMI 27.76 kg/m²       HPI    Review of Systems   Skin:         Splinter of left foot     Physical Exam  Constitutional:       Appearance: Normal appearance. Skin:     Comments: Less than 0.5 cm splinter removed easily with 18 g needle after applying betadine and alcohol to clean and prep left heel. No active bleeding, no erythema, no swelling; mild pain with palpation   Neurological:      Mental Status: He is alert. ASSESSMENT and PLAN    ICD-10-CM ICD-9-CM    1.  Foreign body of left heel  S90.852A 917.6       Soak foot in warm water with epsom salt 2-3 times daily for the next few days  Follow-up if worsening of symptoms

## 2022-12-09 ENCOUNTER — HOSPITAL ENCOUNTER (OUTPATIENT)
Dept: ULTRASOUND IMAGING | Age: 67
End: 2022-12-09
Attending: STUDENT IN AN ORGANIZED HEALTH CARE EDUCATION/TRAINING PROGRAM
Payer: COMMERCIAL

## 2022-12-09 DIAGNOSIS — Z13.6 ENCOUNTER FOR ABDOMINAL AORTIC ANEURYSM (AAA) SCREENING: ICD-10-CM

## 2022-12-09 PROBLEM — K76.0 FATTY LIVER: Status: ACTIVE | Noted: 2022-12-09

## 2022-12-09 PROCEDURE — 76706 US ABDL AORTA SCREEN AAA: CPT

## 2022-12-09 NOTE — PROGRESS NOTES
Sent [employer] message re AAA negative but he does have hepatic parenchymal disease - advised to cut out/back alcohol.

## 2023-02-27 DIAGNOSIS — M19.90 ARTHRITIS: ICD-10-CM

## 2023-02-27 RX ORDER — GABAPENTIN 300 MG/1
600 CAPSULE ORAL
Qty: 180 CAPSULE | Refills: 0 | Status: SHIPPED | OUTPATIENT
Start: 2023-02-27

## 2023-03-31 DIAGNOSIS — E11.9 CONTROLLED TYPE 2 DIABETES MELLITUS WITHOUT COMPLICATION, WITHOUT LONG-TERM CURRENT USE OF INSULIN (HCC): Primary | ICD-10-CM

## 2023-03-31 LAB
EST. AVERAGE GLUCOSE BLD GHB EST-MCNC: 160 MG/DL
HBA1C MFR BLD: 7.2 % (ref 4.8–5.6)

## 2023-03-31 RX ORDER — SEMAGLUTIDE 1.34 MG/ML
1 INJECTION, SOLUTION SUBCUTANEOUS
Qty: 12 EACH | Refills: 1 | Status: SHIPPED | OUTPATIENT
Start: 2023-03-31

## 2023-04-25 ENCOUNTER — TELEPHONE (OUTPATIENT)
Dept: INTERNAL MEDICINE CLINIC | Age: 68
End: 2023-04-25

## 2023-05-26 DIAGNOSIS — M19.90 UNSPECIFIED OSTEOARTHRITIS, UNSPECIFIED SITE: ICD-10-CM

## 2023-05-30 RX ORDER — GABAPENTIN 300 MG/1
CAPSULE ORAL
Qty: 180 CAPSULE | Refills: 0 | Status: SHIPPED | OUTPATIENT
Start: 2023-05-30 | End: 2023-08-28

## 2023-05-30 NOTE — TELEPHONE ENCOUNTER
Orders Placed This Encounter   Medications    gabapentin (NEURONTIN) 300 MG capsule     Sig: TAKE 2 CAPSULES BY MOUTH NIGHTLY. MAX DAILY AMOUNT: 600 MG.      Dispense:  180 capsule     Refill:  0     Not to exceed 5 additional fills before 08/26/2023        reviewed 5/30/2023

## 2023-07-02 ENCOUNTER — HOSPITAL ENCOUNTER (EMERGENCY)
Facility: HOSPITAL | Age: 68
Discharge: HOME | End: 2023-07-02
Attending: STUDENT IN AN ORGANIZED HEALTH CARE EDUCATION/TRAINING PROGRAM
Payer: COMMERCIAL

## 2023-07-02 VITALS
SYSTOLIC BLOOD PRESSURE: 137 MMHG | TEMPERATURE: 97.9 F | OXYGEN SATURATION: 95 % | HEART RATE: 73 BPM | DIASTOLIC BLOOD PRESSURE: 77 MMHG | BODY MASS INDEX: 25.22 KG/M2 | WEIGHT: 191.14 LBS | RESPIRATION RATE: 16 BRPM

## 2023-07-02 DIAGNOSIS — S51.812A LACERATION OF LEFT FOREARM, INITIAL ENCOUNTER: Primary | ICD-10-CM

## 2023-07-02 PROCEDURE — 2500000003 HC RX 250 WO HCPCS: Performed by: STUDENT IN AN ORGANIZED HEALTH CARE EDUCATION/TRAINING PROGRAM

## 2023-07-02 PROCEDURE — 12001 RPR S/N/AX/GEN/TRNK 2.5CM/<: CPT

## 2023-07-02 PROCEDURE — 99282 EMERGENCY DEPT VISIT SF MDM: CPT

## 2023-07-02 RX ORDER — LIDOCAINE HYDROCHLORIDE AND EPINEPHRINE 10; 10 MG/ML; UG/ML
20 INJECTION, SOLUTION INFILTRATION; PERINEURAL
Status: COMPLETED | OUTPATIENT
Start: 2023-07-02 | End: 2023-07-02

## 2023-07-02 RX ADMIN — LIDOCAINE HYDROCHLORIDE,EPINEPHRINE BITARTRATE 20 ML: 10; .01 INJECTION, SOLUTION INFILTRATION; PERINEURAL at 10:59

## 2023-07-02 ASSESSMENT — LIFESTYLE VARIABLES: HOW OFTEN DO YOU HAVE A DRINK CONTAINING ALCOHOL: MONTHLY OR LESS

## 2023-07-02 ASSESSMENT — PAIN DESCRIPTION - FREQUENCY: FREQUENCY: CONTINUOUS

## 2023-07-02 ASSESSMENT — PAIN DESCRIPTION - LOCATION: LOCATION: ARM

## 2023-07-02 ASSESSMENT — PAIN SCALES - GENERAL: PAINLEVEL_OUTOF10: 5

## 2023-07-02 ASSESSMENT — PAIN DESCRIPTION - ONSET: ONSET: SUDDEN

## 2023-07-02 ASSESSMENT — PAIN DESCRIPTION - DESCRIPTORS: DESCRIPTORS: BURNING

## 2023-07-02 ASSESSMENT — PAIN DESCRIPTION - ORIENTATION: ORIENTATION: RIGHT

## 2023-07-24 ENCOUNTER — TELEPHONE (OUTPATIENT)
Age: 68
End: 2023-07-24

## 2023-07-24 NOTE — TELEPHONE ENCOUNTER
----- Message from Denton Holcomb sent at 7/24/2023  8:51 AM EDT -----  Subject: Message to Provider    QUESTIONS  Information for Provider? Patient is wanting a call back if he is able to   get his physical sooner Than 11/15.   ---------------------------------------------------------------------------  --------------  Sissy Rodriguez INFO  [phone]; OK to leave message on voicemail  ---------------------------------------------------------------------------  --------------  SCRIPT ANSWERS  Relationship to Patient?  Self

## 2023-07-25 PROBLEM — E11.9 CONTROLLED TYPE 2 DIABETES MELLITUS WITHOUT COMPLICATION, WITHOUT LONG-TERM CURRENT USE OF INSULIN (HCC): Status: ACTIVE | Noted: 2022-10-18

## 2023-08-24 ENCOUNTER — OFFICE VISIT (OUTPATIENT)
Age: 68
End: 2023-08-24
Payer: COMMERCIAL

## 2023-08-24 VITALS
DIASTOLIC BLOOD PRESSURE: 75 MMHG | OXYGEN SATURATION: 97 % | SYSTOLIC BLOOD PRESSURE: 139 MMHG | WEIGHT: 191.6 LBS | RESPIRATION RATE: 16 BRPM | HEIGHT: 71 IN | HEART RATE: 74 BPM | BODY MASS INDEX: 26.82 KG/M2 | TEMPERATURE: 97.4 F

## 2023-08-24 DIAGNOSIS — I10 PRIMARY HYPERTENSION: ICD-10-CM

## 2023-08-24 DIAGNOSIS — E11.9 CONTROLLED TYPE 2 DIABETES MELLITUS WITHOUT COMPLICATION, WITHOUT LONG-TERM CURRENT USE OF INSULIN (HCC): ICD-10-CM

## 2023-08-24 DIAGNOSIS — Z00.00 ROUTINE GENERAL MEDICAL EXAMINATION AT A HEALTH CARE FACILITY: Primary | ICD-10-CM

## 2023-08-24 DIAGNOSIS — I10 ESSENTIAL (PRIMARY) HYPERTENSION: ICD-10-CM

## 2023-08-24 DIAGNOSIS — E78.2 MIXED HYPERLIPIDEMIA: ICD-10-CM

## 2023-08-24 DIAGNOSIS — Z23 NEED FOR TETANUS, DIPHTHERIA, AND ACELLULAR PERTUSSIS (TDAP) VACCINE: ICD-10-CM

## 2023-08-24 DIAGNOSIS — M43.17 SPONDYLOLISTHESIS OF LUMBOSACRAL REGION: ICD-10-CM

## 2023-08-24 DIAGNOSIS — N18.30 STAGE 3 CHRONIC KIDNEY DISEASE, UNSPECIFIED WHETHER STAGE 3A OR 3B CKD (HCC): ICD-10-CM

## 2023-08-24 DIAGNOSIS — Z12.5 PROSTATE CANCER SCREENING: ICD-10-CM

## 2023-08-24 PROCEDURE — 99397 PER PM REEVAL EST PAT 65+ YR: CPT | Performed by: STUDENT IN AN ORGANIZED HEALTH CARE EDUCATION/TRAINING PROGRAM

## 2023-08-24 PROCEDURE — 3075F SYST BP GE 130 - 139MM HG: CPT | Performed by: STUDENT IN AN ORGANIZED HEALTH CARE EDUCATION/TRAINING PROGRAM

## 2023-08-24 PROCEDURE — 3078F DIAST BP <80 MM HG: CPT | Performed by: STUDENT IN AN ORGANIZED HEALTH CARE EDUCATION/TRAINING PROGRAM

## 2023-08-24 PROCEDURE — 90471 IMMUNIZATION ADMIN: CPT | Performed by: STUDENT IN AN ORGANIZED HEALTH CARE EDUCATION/TRAINING PROGRAM

## 2023-08-24 PROCEDURE — 90715 TDAP VACCINE 7 YRS/> IM: CPT | Performed by: STUDENT IN AN ORGANIZED HEALTH CARE EDUCATION/TRAINING PROGRAM

## 2023-08-24 RX ORDER — LOSARTAN POTASSIUM 50 MG/1
TABLET ORAL
Qty: 90 TABLET | Refills: 3 | OUTPATIENT
Start: 2023-08-24

## 2023-08-24 RX ORDER — LOSARTAN POTASSIUM 50 MG/1
50 TABLET ORAL DAILY
Qty: 90 TABLET | Refills: 3 | Status: SHIPPED | OUTPATIENT
Start: 2023-08-24

## 2023-08-24 RX ORDER — SEMAGLUTIDE 1.34 MG/ML
INJECTION, SOLUTION SUBCUTANEOUS
COMMUNITY
Start: 2023-07-13 | End: 2023-08-24 | Stop reason: SINTOL

## 2023-08-24 RX ORDER — METHOCARBAMOL 500 MG/1
500 TABLET, FILM COATED ORAL
COMMUNITY
Start: 2023-08-18

## 2023-08-24 RX ORDER — PREGABALIN 75 MG/1
75 CAPSULE ORAL 2 TIMES DAILY
COMMUNITY
Start: 2023-07-29

## 2023-08-24 RX ORDER — CYCLOBENZAPRINE HCL 10 MG
10 TABLET ORAL 3 TIMES DAILY PRN
COMMUNITY
Start: 2023-08-24 | End: 2023-09-13

## 2023-08-24 SDOH — ECONOMIC STABILITY: FOOD INSECURITY: WITHIN THE PAST 12 MONTHS, YOU WORRIED THAT YOUR FOOD WOULD RUN OUT BEFORE YOU GOT MONEY TO BUY MORE.: NEVER TRUE

## 2023-08-24 SDOH — ECONOMIC STABILITY: FOOD INSECURITY: WITHIN THE PAST 12 MONTHS, THE FOOD YOU BOUGHT JUST DIDN'T LAST AND YOU DIDN'T HAVE MONEY TO GET MORE.: NEVER TRUE

## 2023-08-24 SDOH — ECONOMIC STABILITY: INCOME INSECURITY: HOW HARD IS IT FOR YOU TO PAY FOR THE VERY BASICS LIKE FOOD, HOUSING, MEDICAL CARE, AND HEATING?: NOT HARD AT ALL

## 2023-08-24 SDOH — ECONOMIC STABILITY: HOUSING INSECURITY
IN THE LAST 12 MONTHS, WAS THERE A TIME WHEN YOU DID NOT HAVE A STEADY PLACE TO SLEEP OR SLEPT IN A SHELTER (INCLUDING NOW)?: NO

## 2023-08-24 ASSESSMENT — PATIENT HEALTH QUESTIONNAIRE - PHQ9
SUM OF ALL RESPONSES TO PHQ QUESTIONS 1-9: 0
SUM OF ALL RESPONSES TO PHQ9 QUESTIONS 1 & 2: 0
SUM OF ALL RESPONSES TO PHQ QUESTIONS 1-9: 0
2. FEELING DOWN, DEPRESSED OR HOPELESS: 0
1. LITTLE INTEREST OR PLEASURE IN DOING THINGS: 0

## 2023-08-24 NOTE — PROGRESS NOTES
Zelalem Chiu is a 79y.o. year old male who presents today (08/24/23) for annual physical exam.    Assessment & Plan:   1. Routine general medical examination at a health care facility  Reviewed diet and exercise habits - he does very will with diet but does not do dedicated physical activity. Updated health maintenance, see below. Check screening labs. 2. Controlled type 2 diabetes mellitus without complication, without long-term current use of insulin ([address])  Assessment & Plan:  He reports he could never tolerate ozempic but kept taking the medication because the side effects were not un-expected. He is having diarrhea and very strong appetite suppression. He has lost 20 lb. This is concerning to his wife. Will stop ozempic. Discussed possibly replacing with SGLT2 if necessary. Update A1c today. Cont statin. Orders:  -     Hemoglobin A1C; Future  -     Microalbumin / Creatinine Urine Ratio; Future  3. Primary hypertension  Assessment & Plan:  Controlled, cont losartan   Orders:  -     CBC; Future  -     Comprehensive Metabolic Panel; Future  -     losartan (COZAAR) 50 MG tablet; Take 1 tablet by mouth daily, Disp-90 tablet, R-3Normal  4. Mixed hyperlipidemia  Assessment & Plan:  Update lipid panel to assess control. Cont statin. Orders:  -     Lipid Panel; Future  5. Stage 3 chronic kidney disease, unspecified whether stage 3a or 3b CKD ([address])  Comments:  borderline. advised to stop NSAIDS last visit, which he did. will update renal function. Orders:  -     CBC; Future  -     Comprehensive Metabolic Panel; Future  6. Spondylolisthesis of lumbosacral region  Assessment & Plan:  He is following with orthopedics. He was transitioned from gabapentin to lyrica and is taking 2 different muscle relaxers. He is still in a lot of pain. He is going to have a lumbar fusion in Oct   7. Need for tetanus, diphtheria, and acellular pertussis (Tdap) vaccine  -     Tdap, BOOSTRIX, (age 8 yrs+), IM  8.  Prostate cancer

## 2023-08-24 NOTE — ASSESSMENT & PLAN NOTE
He is following with orthopedics. He was transitioned from gabapentin to lyrica and is taking 2 different muscle relaxers. He is still in a lot of pain.  He is going to have a lumbar fusion in Oct Hematology/Oncology Outpatient Follow- up Note  Kathy Aragon, 1964, [id number]  2022        Chief Complaint   Patient presents with    Follow-up       HPI:  Kathy Aragon is a 65 yo autistic female with a history of cerebral palsy and stage IA ER/MA positive, HER2 positive breast cancer s/p right mastectomy in   She was treated adjuvant chemotherapy with [facility] followed by Herceptin for one year, completed in 2015  She was then started on Tamoxifen, this was discontinued in 2018 when she was noted to be post-menopause  She was started on Arimidex and is currently maintained on 1 mg daily with goal to complete total of 10 years of therapy (combination of tamoxifen and Arimidex)      She also has a history of iron deficiency anemia and has been treated with IV Venofer in the past        Previous Hematologic/ Oncologic History:    Oncology History   Malignant neoplasm of overlapping sites of right breast in female, estrogen receptor positive ([facility] [address] )    Surgery    Right breast mastectomy   Invasive breast carcinoma  Grade 2  2 foci:    MA 60/90  HER2 0/3+  Lymph nodes negative  Stage IA  Dr Farrukh Lora     2014 - 2015 Chemotherapy    TCH x 6 cycles  1 year of Herceptin  Dr Jeni Valentine     2015 -  Hormone Therapy    Tamoxifen daily until 2018  Arimidex 1 mg daily since   Dr Jeni Valentine     Patient is status post a mastectomy on the right side  Chemotherapy with TCH  She had 6 cycles  Her treatment started in 2014  Then got one year of Herceptin which ended in 2015     Tamoxifen started in  which was then converted to Arimidex in      Venofer 200mg IV weekly x 6 doses    Current Hematologic/ Oncologic Treatment:    Arimidex 1 milligram daily    ECO - Symptomatic but completely ambulatory    Interval History:   The patient presents for routine follow up along with her mother and personal care nurse  Most recent blood work completed was reviewed   Her counts are in acceptable range  No evidence of anemia or iron deficiency  We are no longer checking tumor marker as it does not correlate with disease in her case  She states she is tolerating the Arimidex well  She has some occasional hot flashes and fatigue  Evidence of osteoporosis was demonstrated on DEXA scan from 10/2020  At that time, patient was started on Fosamax by her PCP  PCP discontinued this at the last visit on 3/18/22 and told her that she should receive something stronger  She takes Vitamin D and calcium daily  She had her yearly follow-up visit with Dr Jn Bah on 1/10/2022  Breast exam was benign  Last mammogram completed showed no evidence of metastatic disease  Her next mammogram is scheduled for November  Patient reports no current breast masses, pain, skin changes, or discharge  Pt also has chronic GERD and experiences nausea  She follows with GI  Cancer Staging:  Cancer Staging  No matching staging information was found for the patient  Molecular Testing:         Test Results:    Imaging: No results found  Labs:   Lab Results   Component Value Date    WBC 9 56 01/20/2022    HGB 12 9 01/20/2022    HCT 45 2 01/20/2022    MCV 97 01/20/2022     01/20/2022     Lab Results   Component Value Date     (L) 12/18/2013    K 3 8 11/16/2021     11/16/2021    CO2 28 11/16/2021    ANIONGAP 5 12/18/2013    BUN 9 11/16/2021    CREATININE 0 62 11/16/2021    GLUCOSE 127 12/18/2013    GLUF 97 11/16/2021    CALCIUM 8 8 11/16/2021    CORRECTEDCA 9 5 11/16/2021    AST 10 11/16/2021    ALT 18 11/16/2021    ALKPHOS 75 11/16/2021    EGFR 102 11/16/2021           Review of Systems   Constitutional: Positive for fatigue  Negative for activity change  Respiratory: Negative  Cardiovascular: Negative  Gastrointestinal: Positive for nausea  Negative for abdominal pain, blood in stool, constipation and diarrhea  Musculoskeletal: Positive for arthralgias  Skin: Negative  Hematological: Negative  All other systems reviewed and are negative  Active Problems:   Patient Active Problem List   Diagnosis    Vitamin D deficiency    Scoliosis    Retinal detachment    Obsessive compulsive disorder    Multiple thyroid nodules    Mild intellectual disability    Malignant neoplasm of overlapping sites of right breast in female, estrogen receptor positive ([address] )    Legal blindness [place]    Irregular menstrual cycle    Internal hemorrhoids    Impulse control disorder    Glaucoma    Dyslipidemia    Depression    Dandy-Walker syndrome (HCC)    Chronic gingivitis    Cerebral palsy (HCC)    Benign neoplasm of large intestine    Autistic disorder    Anxiety    Other long term (current) drug therapy    Hyperglycemia    Screening for colorectal cancer    Chronic GERD    Iron deficiency anemia, unspecified    Use of anastrozole (Arimidex)    Family history of colonic polyps    Osteoporosis    Ambulatory dysfunction    Primary osteoarthritis of left shoulder    Other specified anemias       Past Medical History:   Past Medical History:   Diagnosis Date    Arthritis 11/18/2021    right shoulder    Autism     Breast cancer ([address] )     Breast cyst, left     last assessed 12/30/2013    Cerebral palsy (HCC)     Chronic GERD     Depression     Fibrocystic breast disease     last assessed 06/29/2012    Gastrointestinal hemorrhage     Glaucoma     History of chemotherapy     Hydrocephalus ([address] )      SHUNT IN PLACE    Scoliosis     Scoliosis        Surgical History:   Past Surgical History:   Procedure Laterality Date    BREAST BIOPSY Right     CATARACT EXTRACTION  06/14/2021    COLONOSCOPY      Description 04/13/2016-5 yrs  Description 4 yr f/u d/t polyp and family history, onset 07/20/2012      COLONOSCOPY      ESOPHAGOGASTRODUODENOSCOPY      description-04/13/2016 gastritis with antral nodule    EYE SURGERY      for relief of intraocular pressure    INCISIONAL BREAST BIOPSY      description 2008    MASTECTOMY Left     SENTINEL LYMPH NODE BIOPSY      STRABISMUS SURGERY      description 1973, 67    UPPER GASTROINTESTINAL ENDOSCOPY  05/16/2019    Grade C erosive esophagitis  7 mm nodule in the stomach       Family History:    Family History   Problem Relation Age of Onset    Osteopenia Mother     Heart attack Father 59        Acute MI    Coronary artery disease Father     Thyroid nodules Father     Osteopenia Maternal Grandmother     Prostate cancer Maternal Grandfather     Leukemia Paternal Grandfather     Heart attack Maternal Uncle 48        acute MI, stent    Cancer Maternal Uncle         adenocarcinoma of oral cavity    Colon cancer Maternal Uncle 62    Hyperlipidemia Maternal Uncle     Pancreatic cancer Paternal Uncle     Prostate cancer Paternal Uncle     Colon cancer Family     Coronary artery disease Family     Thyroid disease Family        Cancer-related family history includes Cancer in her maternal uncle; Colon cancer in her family; Colon cancer (age of onset: 62) in her maternal uncle; Pancreatic cancer in her paternal uncle; Prostate cancer in her maternal grandfather and paternal uncle      Social History:   Social History     Socioeconomic History    Marital status: Single     Spouse name: Not on file    Number of children: Not on file    Years of education: Not on file    Highest education level: Not on file   Occupational History    Not on file   Tobacco Use    Smoking status: Never Smoker    Smokeless tobacco: Never Used   Vaping Use    Vaping Use: Never used   Substance and Sexual Activity    Alcohol use: No    Drug use: No    Sexual activity: Not on file   Other Topics Concern    Not on file   Social History Narrative    Person living in a residential institution    Uses safety equipment-seat belts     Social Determinants of Health     Financial Resource Strain: Not on file   Food Insecurity: Not on file Transportation Needs: Not on file   Physical Activity: Not on file   Stress: Not on file   Social Connections: Not on file   Intimate Partner Violence: Not on file   Housing Stability: Not on file       Current Medications:   Current Outpatient Medications   Medication Sig Dispense Refill    acetaminophen (TYLENOL) 500 mg tablet Take 500 mg by mouth as needed for mild pain        anastrozole (ARIMIDEX) 1 mg tablet TAKE 1 TABLET BY MOUTH AT BEDTIME DX:BREAST CANCER PREVENTION (WEAR GLOVES WHEN ADMINISTERING) 90 tablet 3    BANOPHEN 25 MG capsule Take 25 mg by mouth daily at bedtime as needed       calamine lotion Apply topically every 4 (four) hours as needed for itching      Calcium Carbonate-Vitamin D (OYSCO 500/D PO) Take by mouth daily      carBAMazepine (TEGretol) 200 mg tablet Take 1 tablet (200 mg total) by mouth 2 (two) times a day      Carboxymethylcell-Hypromellose (GENTEAL) 0 25-0 3 % GEL Apply to eye       Cholecalciferol (VITAMIN D) 2000 units tablet TAKE TWO TABLETS BY MOUTH (4000IU) EVERY MORNING FOR VITAMIN DIFICIENCY 62 tablet 0    Dentifrices (SENSODYNE PRONAMEL DT) Apply to teeth       Diclofenac Sodium (VOLTAREN) 1 %       diphenhydrAMINE (BENADRYL) 25 mg tablet Take 25 mg by mouth daily at bedtime as needed for itching (for insomnia and allergy ** DO NOT TAKE WITH XYZAL **)       docusate sodium (COLACE) 100 mg capsule Take 100 mg by mouth 3 (three) times a week       haloperidol (HALDOL) 2 mg tablet Take 2 mg by mouth 2 (two) times a day 1 tab am and 2 tabs qhs      hydrocortisone (ANUSOL-HC, PROCTOSOL HC,) 2 5 % rectal cream Insert into the rectum 2 (two) times a day (Patient taking differently: Insert into the rectum 2 (two) times a day as needed for hemorrhoids  ) 30 g 0    ibuprofen (MOTRIN) 200 mg tablet Take 400 mg by mouth every 6 (six) hours as needed for mild pain        latanoprost (XALATAN) 0 005 % ophthalmic solution Apply 1 drop to eye      levocetirizine (XYZAL) 5 MG tablet Take 1 tablet by mouth daily as needed      lithium carbonate 300 mg capsule Take 600 mg by mouth 2 (two) times a day      LORazepam (ATIVAN) 0 5 mg tablet Take 1 mg by mouth 2 (two) times a day       melatonin 1 mg Take 5 mg by mouth daily at bedtime       memantine (NAMENDA) 10 mg tablet Take 1 tablet (10 mg total) by mouth 2 (two) times a day 60 tablet 5    mineral oil-white petrolatum (LUBRIFRESH P M ) ophthalmic ointment 0 5 inches       neomycin-polymyxin b-bacitracin (NEOSPORIN) 3 5-400-5,000       NON FORMULARY ACT FLUORIDE DENTAL RINSE   RINSE WITH 1 TBSP (15ML)2X A DAY      NON FORMULARY TOLNAFTATE SPRAY 1% SPRAY  SPRAY BETWEEN TOES      ondansetron (ZOFRAN) 4 mg tablet Take by mouth as needed        pantoprazole (PROTONIX) 40 mg tablet TAKE ONE TABLET BY MOUTH 2 TIMES A DAY( MORNING/ BEDTIME) (ESOPHAGITIS) 60 tablet 0    polyethylene glycol (MIRALAX) 17 g packet Take 17 g by mouth daily as needed      PROCTOSOL HC 2 5 % rectal cream       sodium chloride (SOCHLOR) 5 % hypertonic ophthalmic solution Apply 1 drop to eye 2 (two) times a day        sodium fluoride 0 275 (0 125 F) MG/DROP solution Take 275 mcg by mouth daily       Sunscreens (SUNBLOCK LOTION SPF30 EX) Apply topically daily as needed      talc (Zeasorb) Apply topically once as needed for irritation Sprinkle powder on affected area of skin to help with excess moisture up to 2 times a day as needed         tolnaftate (TINACTIN) 1 % spray Apply topically      white petrolatum (Vaseline) Apply topically once Apply to hand every day prn      bromfenac sodium (Prolensa) 0 07 % SOLN Apply to eye 1 DROP IN RIGHT EYE IN THE MORNING (Patient not taking: Reported on 11/18/2021 )      citalopram (CeleXA) 10 mg tablet Take 10 mg by mouth daily 30 mg total  (Patient not taking: Reported on 5/4/2022 )      guaiFENesin (SILTUSSIN SA PO) Take by mouth Take 2 teaspoonful (10 ml) PRN 6 hrs for cough (Patient not taking: Reported on 5/4/2022 )      ketorolac (ACULAR) 0 5 % ophthalmic solution   (Patient not taking: Reported on 5/4/2022 )      loperamide (IMODIUM) 2 mg capsule Take by mouth  (Patient not taking: Reported on 5/4/2022 )      ofloxacin (OCUFLOX) 0 3 % ophthalmic solution  (Patient not taking: Reported on 11/18/2021 )      timolol (TIMOPTIC-XE) 0 5 % ophthalmic gel-forming Administer 1 drop into the left eye daily (Patient not taking: Reported on 5/4/2022 )       No current facility-administered medications for this visit  Allergies: Allergies   Allergen Reactions    Bactrim [Sulfamethoxazole-Trimethoprim]     Methylphenidate Hyperactivity     Reaction Date: [id number];     Sulfa Antibiotics     Thioridazine     Amphetamines     Chlorpromazine     Fexofenadine Hives     Reaction Date: [id number];     Medrogestone     Medroxyprogesterone Rash and Hives     Reaction Date: [id number];     Other Hives     Mellaril, thorazine    Trimethoprim        Physical Exam:  /78 (BP Location: Left arm, Patient Position: Sitting, Cuff Size: Adult)   Pulse 81   Temp 97 8 °F (36 6 °C) (Temporal)   Resp 16   Ht 4' 9 5" (1 461 m)   Wt 58 1 kg (128 lb)   SpO2 93%   BMI 27 22 kg/m²   Body surface area is 1 5 meters squared  Wt Readings from Last 3 Encounters:   05/04/22 58 1 kg (128 lb)   03/25/22 60 3 kg (133 lb)   03/18/22 59 4 kg (131 lb)           Physical Exam  Constitutional:       General: She is not in acute distress  Appearance: Normal appearance  She is not ill-appearing or toxic-appearing  Comments: Patient is alert but tired  Able to answer questions about her symptoms    HENT:      Head: Normocephalic and atraumatic  Eyes:      General: No scleral icterus  Right eye: No discharge  Left eye: No discharge  Conjunctiva/sclera: Conjunctivae normal    Neck:      Comments: Shunt in place on R side is palpable  Cardiovascular:      Rate and Rhythm: Normal rate and regular rhythm  Pulmonary:      Effort: Pulmonary effort is normal  No respiratory distress  Breath sounds: Normal breath sounds  Chest:   Breasts:      Right: No axillary adenopathy or supraclavicular adenopathy  Left: No axillary adenopathy or supraclavicular adenopathy  Abdominal:      General: Bowel sounds are normal  There is no distension  Palpations: Abdomen is soft  There is no mass  Tenderness: There is no abdominal tenderness  Musculoskeletal:         General: Normal range of motion  Cervical back: No tenderness  Lymphadenopathy:      Cervical: No cervical adenopathy  Upper Body:      Right upper body: No supraclavicular, axillary or pectoral adenopathy  Left upper body: No supraclavicular, axillary or pectoral adenopathy  Skin:     General: Skin is warm and dry  Neurological:      General: No focal deficit present  Mental Status: She is alert and oriented to person, place, and time  Mental status is at baseline  Psychiatric:         Mood and Affect: Mood normal          Behavior: Behavior normal          Assessment / Plan:    1  Osteoporosis, unspecified osteoporosis type, unspecified pathological fracture presence    2  Use of anastrozole (Arimidex)    3  Malignant neoplasm of overlapping sites of right breast in female, estrogen receptor positive [facility])      The patient is a 67 yo autistic female with a history of cerebral palsy and stage IA ER/MI positive, HER2 positive breast cancer s/p right mastectomy in 2013  She was treated adjuvant chemotherapy with [facility] followed by Herceptin for one year, completed in 2/2015  She was then started on Tamoxifen, this was discontinued in 2018 when she was noted to be post-menopause   She was started on Arimidex and is currently maintained on 1 mg daily with goal to complete total of 10 years of therapy (combination of tamoxifen and Arimidex)      She also has a history of iron deficiency anemia and has been treated with IV Venofer in the past   Most recent blood work demonstrates no evidence of anemia or iron deficiency  We no longer follow tumor marker as it has been unreliable in the past       Patient is tolerating Arimidex well and has no breast complaints  She had her yearly follow-up visit with Dr Edward Beckwith on 1/10/2022  Breast exam was benign  Most recent mammogram in 11/2021 shows no evidence of malignancy  Her next mammogram is scheduled for November  Evidence of osteoporosis was demonstrated on DEXA scan from 10/2020  PCP discontinued Fosamax at the last visit on 3/18/22 and told her that she should receive something stronger  Will arrange for her to start Prolia injections in June  I reviewed potential side effects to include osteonecrosis of the jaw, muscle weakness, fatigue, hypocalcemia, hypophosphatemia  Patient, mother and nurse were presented with written information on Prolia  They are agreeable to recommendation to start Prolia  This will be given every 6 months  Patient will continue to follow-up every 6 months with repeat blood work  Patient, her mother, and nurse verbalized understanding and are in agreement with plan of care  They are instructed to call at any time with questions or concerns prior to the next visit    Goals and Barriers:  Current Goal:  Prolong Survival from breast cancer  Barriers: None  Patient's Capacity to Self Care:  Patient needs assistance to self care  Portions of the record may have been created with voice recognition software  Occasional wrong word or "sound a like" substitutions may have occurred due to the inherent limitations of voice recognition software  Read the chart carefully and recognize, using context, where substitutions have occurred

## 2023-08-24 NOTE — ASSESSMENT & PLAN NOTE
He reports he could never tolerate ozempic but kept taking the medication because the side effects were not un-expected. He is having diarrhea and very strong appetite suppression. He has lost 20 lb. This is concerning to his wife. Will stop ozempic. Discussed possibly replacing with SGLT2 if necessary. Update A1c today. Cont statin.

## 2023-08-26 LAB
ALBUMIN SERPL-MCNC: 4.4 G/DL (ref 3.9–4.9)
ALBUMIN/CREAT UR: 4 MG/G CREAT (ref 0–29)
ALBUMIN/GLOB SERPL: 2.4 {RATIO} (ref 1.2–2.2)
ALP SERPL-CCNC: 53 IU/L (ref 44–121)
ALT SERPL-CCNC: 16 IU/L (ref 0–44)
AST SERPL-CCNC: 18 IU/L (ref 0–40)
BILIRUB SERPL-MCNC: 0.2 MG/DL (ref 0–1.2)
BUN SERPL-MCNC: 20 MG/DL (ref 8–27)
BUN/CREAT SERPL: 20 (ref 10–24)
CALCIUM SERPL-MCNC: 9.2 MG/DL (ref 8.6–10.2)
CHLORIDE SERPL-SCNC: 102 MMOL/L (ref 96–106)
CHOLEST SERPL-MCNC: 111 MG/DL (ref 100–199)
CO2 SERPL-SCNC: 28 MMOL/L (ref 20–29)
CREAT SERPL-MCNC: 1.01 MG/DL (ref 0.76–1.27)
CREAT UR-MCNC: 80.6 MG/DL
EGFRCR SERPLBLD CKD-EPI 2021: 82 ML/MIN/1.73
ERYTHROCYTE [DISTWIDTH] IN BLOOD BY AUTOMATED COUNT: 13.2 % (ref 11.6–15.4)
GLOBULIN SER CALC-MCNC: 1.8 G/DL (ref 1.5–4.5)
GLUCOSE SERPL-MCNC: 130 MG/DL (ref 70–99)
HBA1C MFR BLD: 6.7 % (ref 4.8–5.6)
HCT VFR BLD AUTO: 42.3 % (ref 37.5–51)
HDLC SERPL-MCNC: 38 MG/DL
HGB BLD-MCNC: 14 G/DL (ref 13–17.7)
LDLC SERPL CALC-MCNC: 58 MG/DL (ref 0–99)
MCH RBC QN AUTO: 30 PG (ref 26.6–33)
MCHC RBC AUTO-ENTMCNC: 33.1 G/DL (ref 31.5–35.7)
MCV RBC AUTO: 91 FL (ref 79–97)
MICROALBUMIN UR-MCNC: 3.2 UG/ML
PLATELET # BLD AUTO: 182 X10E3/UL (ref 150–450)
POTASSIUM SERPL-SCNC: 4.6 MMOL/L (ref 3.5–5.2)
PROT SERPL-MCNC: 6.2 G/DL (ref 6–8.5)
PSA SERPL-MCNC: 1 NG/ML (ref 0–4)
RBC # BLD AUTO: 4.66 X10E6/UL (ref 4.14–5.8)
SODIUM SERPL-SCNC: 140 MMOL/L (ref 134–144)
TRIGL SERPL-MCNC: 71 MG/DL (ref 0–149)
VLDLC SERPL CALC-MCNC: 15 MG/DL (ref 5–40)
WBC # BLD AUTO: 5.5 X10E3/UL (ref 3.4–10.8)

## 2023-08-28 ENCOUNTER — TELEPHONE (OUTPATIENT)
Age: 68
End: 2023-08-28

## 2023-08-28 NOTE — TELEPHONE ENCOUNTER
Left vm for patient to contact the office to schedule 3 month follow-up appointment in November.   8/28/23

## 2023-09-24 DIAGNOSIS — K21.9 GASTRO-ESOPHAGEAL REFLUX DISEASE WITHOUT ESOPHAGITIS: ICD-10-CM

## 2023-09-25 RX ORDER — PANTOPRAZOLE SODIUM 40 MG/1
40 TABLET, DELAYED RELEASE ORAL DAILY
Qty: 90 TABLET | Refills: 3 | Status: SHIPPED | OUTPATIENT
Start: 2023-09-25

## 2023-09-26 DIAGNOSIS — E11.9 TYPE 2 DIABETES MELLITUS WITHOUT COMPLICATIONS (HCC): ICD-10-CM

## 2023-09-26 RX ORDER — SIMVASTATIN 40 MG
40 TABLET ORAL
Qty: 90 TABLET | Refills: 0 | Status: SHIPPED | OUTPATIENT
Start: 2023-09-26

## 2023-09-26 NOTE — TELEPHONE ENCOUNTER
Pt last seen on 8/24/23. Due to return in 3 months. Has appt on 12/22/23. Rx last filled on 11/1/22 #90/3RF. Rx sent to pharmacy and verified by Verbal Order Read Back with provider.

## 2023-10-01 DIAGNOSIS — E11.9 TYPE 2 DIABETES MELLITUS WITHOUT COMPLICATIONS (HCC): ICD-10-CM

## 2023-10-02 RX ORDER — SEMAGLUTIDE 1.34 MG/ML
INJECTION, SOLUTION SUBCUTANEOUS
Refills: 2 | OUTPATIENT
Start: 2023-10-02

## 2023-10-13 ENCOUNTER — TELEPHONE (OUTPATIENT)
Age: 68
End: 2023-10-13

## 2023-10-13 NOTE — TELEPHONE ENCOUNTER
Pt referred to:  [facility]  [address] [address]  T [phone]  F [phone] Reason for call: Spoke with pt. He requested a call back from nurse in regards a health question. Pt would not provide any other information.     Is this a new problem: Yes    Date of last appointment:  8/24/2023     Can we respond via [email or web address]: No    Best call back number: Chito Chan  [phone]

## 2023-10-13 NOTE — TELEPHONE ENCOUNTER
Spoke with pt - he states his back surgery may be canceled due to insurance reasons. He will know more on Monday. He wanted to let MD know as he is scheduled for pre op on Tuesday 10/17/23. He will call Monday with an update.

## 2023-10-16 ENCOUNTER — TELEPHONE (OUTPATIENT)
Age: 68
End: 2023-10-16

## 2023-10-16 NOTE — TELEPHONE ENCOUNTER
Advised pt his [facility] forms for him and his wife have been completed by Dr Maggie Agustin. We can not fax for him as they both need to sign form. Pt will  tomorrow. Forms placed in envelop with pt's name and date of birth. In basket to go to front.

## 2023-10-20 ENCOUNTER — OFFICE VISIT (OUTPATIENT)
Age: 68
End: 2023-10-20
Payer: COMMERCIAL

## 2023-10-20 ENCOUNTER — TELEPHONE (OUTPATIENT)
Age: 68
End: 2023-10-20

## 2023-10-20 VITALS
RESPIRATION RATE: 16 BRPM | HEIGHT: 71 IN | DIASTOLIC BLOOD PRESSURE: 70 MMHG | WEIGHT: 187.8 LBS | BODY MASS INDEX: 26.29 KG/M2 | TEMPERATURE: 97.8 F | OXYGEN SATURATION: 98 % | SYSTOLIC BLOOD PRESSURE: 130 MMHG

## 2023-10-20 DIAGNOSIS — Z01.818 PRE-OP EXAM: Primary | ICD-10-CM

## 2023-10-20 PROCEDURE — 1123F ACP DISCUSS/DSCN MKR DOCD: CPT | Performed by: NURSE PRACTITIONER

## 2023-10-20 PROCEDURE — 3075F SYST BP GE 130 - 139MM HG: CPT | Performed by: NURSE PRACTITIONER

## 2023-10-20 PROCEDURE — 3078F DIAST BP <80 MM HG: CPT | Performed by: NURSE PRACTITIONER

## 2023-10-20 PROCEDURE — 99214 OFFICE O/P EST MOD 30 MIN: CPT | Performed by: NURSE PRACTITIONER

## 2023-10-20 NOTE — PROGRESS NOTES
Date of Exam: 10/20/2023    Gwendolyn Loza is a 79 y.o. male (:1955) who presents for preoperative evaluation. Procedure/Surgery: lumbar fusion  Date of Procedure/Surgery: 10/24/23  Surgeon: Mia Espinal: [facility]  Primary Physician: Vijay Lui MD  Latex Allergy: Yes    Current Outpatient Medications   Medication Sig Dispense Refill    simvastatin (ZOCOR) 40 MG tablet TAKE 1 TABLET BY MOUTH NIGHTLY 90 tablet 0    pantoprazole (PROTONIX) 40 MG tablet TAKE 1 TABLET BY MOUTH EVERY DAY 90 tablet 3    metFORMIN (GLUCOPHAGE) 500 MG tablet TAKE 2 TABLETS BY MOUTH TWO (2) TIMES DAILY (WITH MEALS). 360 tablet 3    pregabalin (LYRICA) 75 MG capsule Take 1 capsule by mouth 2 times daily. methocarbamol (ROBAXIN) 500 MG tablet Take 1 tablet by mouth      losartan (COZAAR) 50 MG tablet Take 1 tablet by mouth daily 90 tablet 3    diclofenac sodium (VOLTAREN) 1 % GEL Apply 2 g topically 4 times daily as needed      ketoconazole (NIZORAL) 2 % shampoo 1 (ONE) APPLICATION MASSAGE INTO SCALP, LET SIT FOR 5 MINUTES, AND RINSE      mometasone (NASONEX) 50 MCG/ACT nasal spray 2 sprays daily as needed       No current facility-administered medications for this visit. Past Medical History:   Diagnosis Date    Diabetes ([address])     DJD (degenerative joint disease)     GERD (gastroesophageal reflux disease)     HTN (hypertension)     Shingles         Past Surgical History:   Procedure Laterality Date    CHOLECYSTECTOMY      gall stone pancreatitis    KNEE ARTHROSCOPY Bilateral     MOHS SURGERY Right     ROTATOR CUFF REPAIR Right         Tetanus up to date: last tetanus booster within 10 years      Anesthesia Complications: no  History of abnormal bleeding : no  History of Blood Transfusions: No  Health Care Directive or Living Will: Yes    REVIEW OF SYSTEMS:  Pertinent items are noted in HPI.     EXAM:   /70 (Site: Right Upper Arm, Position: Sitting, Cuff Size:

## 2023-10-20 NOTE — TELEPHONE ENCOUNTER
Reason for call:  the wellness forms need a date for pt and spouse as well   Please call when done    Is this a new problem: Yes    Date of last appointment:  10/20/2023     Can we respond via [email or web address]: No    Best call back number:    Gwendolyn Dago (Self) [phone] (Home)

## 2023-11-27 DIAGNOSIS — E11.9 TYPE 2 DIABETES MELLITUS WITHOUT COMPLICATIONS (HCC): ICD-10-CM

## 2023-11-27 RX ORDER — SIMVASTATIN 40 MG
40 TABLET ORAL NIGHTLY
Qty: 90 TABLET | Refills: 2 | Status: SHIPPED | OUTPATIENT
Start: 2023-11-27

## 2023-11-27 NOTE — TELEPHONE ENCOUNTER
Pt last seen on 8/24/23. Due to return in 3 months. Has appt on 12/22/23. Rx last filled on 9/26/23. Will forward to MD for refill.

## 2023-12-13 ENCOUNTER — OFFICE VISIT (OUTPATIENT)
Age: 68
End: 2023-12-13
Payer: COMMERCIAL

## 2023-12-13 VITALS
HEART RATE: 83 BPM | DIASTOLIC BLOOD PRESSURE: 74 MMHG | WEIGHT: 186.8 LBS | OXYGEN SATURATION: 97 % | SYSTOLIC BLOOD PRESSURE: 136 MMHG | BODY MASS INDEX: 25.81 KG/M2 | RESPIRATION RATE: 16 BRPM

## 2023-12-13 DIAGNOSIS — Z01.818 PRE-OP EXAMINATION: Primary | ICD-10-CM

## 2023-12-13 PROCEDURE — 3078F DIAST BP <80 MM HG: CPT | Performed by: NURSE PRACTITIONER

## 2023-12-13 PROCEDURE — 1123F ACP DISCUSS/DSCN MKR DOCD: CPT | Performed by: NURSE PRACTITIONER

## 2023-12-13 PROCEDURE — 99214 OFFICE O/P EST MOD 30 MIN: CPT | Performed by: NURSE PRACTITIONER

## 2023-12-13 PROCEDURE — 3075F SYST BP GE 130 - 139MM HG: CPT | Performed by: NURSE PRACTITIONER

## 2023-12-13 RX ORDER — BLOOD SUGAR DIAGNOSTIC
STRIP MISCELLANEOUS
COMMUNITY
Start: 2020-05-12

## 2023-12-13 RX ORDER — SODIUM CHLORIDE 20 MG/ML
SOLUTION OPHTHALMIC
COMMUNITY

## 2023-12-13 RX ORDER — CHLORAL HYDRATE 500 MG
CAPSULE ORAL
COMMUNITY

## 2023-12-13 RX ORDER — LANCING DEVICE/LANCETS
KIT MISCELLANEOUS
COMMUNITY
Start: 2014-06-27

## 2024-01-04 ENCOUNTER — TELEPHONE (OUTPATIENT)
Age: 69
End: 2024-01-04

## 2024-01-04 NOTE — TELEPHONE ENCOUNTER
Medication Refill Request    Mychal Jefferson is requesting a refill of the following medication(s):   metFORMIN (GLUCOPHAGE) 500 MG tablet                   Please send refill to:     [facility]/pharmacy #[id number] - [place], [place] - [address] [address] [facility] [phone] - F [phone] [address] [address] [facility] [address]  Phone: [phone] Fax: [phone]

## 2024-01-04 NOTE — TELEPHONE ENCOUNTER
Needs to call pharmacy for refill. Sent in 90 day supply with 3 refills on 9/11/23    Gricelda Schmidt MD

## 2024-01-04 NOTE — TELEPHONE ENCOUNTER
Advised pt MD said needs to call pharmacy for refill. Sent in 90 day supply with 3 refills on 9/11/23.

## 2024-01-16 ENCOUNTER — OFFICE VISIT (OUTPATIENT)
Age: 69
End: 2024-01-16
Payer: COMMERCIAL

## 2024-01-16 VITALS
WEIGHT: 189 LBS | OXYGEN SATURATION: 99 % | HEART RATE: 70 BPM | SYSTOLIC BLOOD PRESSURE: 122 MMHG | TEMPERATURE: 97.1 F | RESPIRATION RATE: 18 BRPM | HEIGHT: 71 IN | DIASTOLIC BLOOD PRESSURE: 75 MMHG | BODY MASS INDEX: 26.46 KG/M2

## 2024-01-16 DIAGNOSIS — L08.9 PUNCTURE WOUND OF LEFT FOOT EXCLUDING TOES WITH INFECTION, INITIAL ENCOUNTER: Primary | ICD-10-CM

## 2024-01-16 DIAGNOSIS — S91.332A PUNCTURE WOUND OF LEFT FOOT EXCLUDING TOES WITH INFECTION, INITIAL ENCOUNTER: Primary | ICD-10-CM

## 2024-01-16 PROCEDURE — 99213 OFFICE O/P EST LOW 20 MIN: CPT | Performed by: NURSE PRACTITIONER

## 2024-01-16 PROCEDURE — 1123F ACP DISCUSS/DSCN MKR DOCD: CPT | Performed by: NURSE PRACTITIONER

## 2024-01-16 PROCEDURE — 3074F SYST BP LT 130 MM HG: CPT | Performed by: NURSE PRACTITIONER

## 2024-01-16 PROCEDURE — 3078F DIAST BP <80 MM HG: CPT | Performed by: NURSE PRACTITIONER

## 2024-01-16 RX ORDER — CEPHALEXIN 500 MG/1
500 CAPSULE ORAL 4 TIMES DAILY
Qty: 28 CAPSULE | Refills: 0 | Status: SHIPPED | OUTPATIENT
Start: 2024-01-16 | End: 2024-01-23

## 2024-01-16 ASSESSMENT — PATIENT HEALTH QUESTIONNAIRE - PHQ9
2. FEELING DOWN, DEPRESSED OR HOPELESS: 0
SUM OF ALL RESPONSES TO PHQ QUESTIONS 1-9: 0
1. LITTLE INTEREST OR PLEASURE IN DOING THINGS: 0
SUM OF ALL RESPONSES TO PHQ QUESTIONS 1-9: 0
SUM OF ALL RESPONSES TO PHQ9 QUESTIONS 1 & 2: 0

## 2024-01-16 NOTE — PROGRESS NOTES
Chief Complaint   Patient presents with    Laceration     On left foot near the heel was cut three days ago     Blood pressure 122/75, pulse 70, temperature 97.1 °F (36.2 °C), temperature source Temporal, resp. rate 18, height 1.803 m (5' 11\"), weight 85.7 kg (189 lb), SpO2 99 %.

## 2024-01-16 NOTE — PROGRESS NOTES
Mychal Jefferson (:  1955) is a 69 y.o. male,here for evaluation of the following chief complaint(s):  Laceration (On left foot near the heel was cut three days ago)    /75   Pulse 70   Temp 97.1 °F (36.2 °C) (Temporal)   Resp 18   Ht 1.803 m (5' 11\")   Wt 85.7 kg (189 lb)   SpO2 99%   BMI 26.36 kg/m²       SUBJECTIVE/OBJECTIVE:    HPI:Patient reports infected wound of left foot just below ankle after the door storm door slammed onto his foot last week. He sustained a laceration. He has applied neosporin with little relief. He notes increased redness, tenderness, and yellow drainage today. No fever. Tdap is up to date.    Review of Systems   Skin:  Positive for wound.       Physical Exam  Constitutional:       Appearance: Normal appearance.   Skin:     Findings: Wound (wound of left foot just below ankle with redness and purulent drainage; mild pain and swelling noted) present.   Neurological:      General: No focal deficit present.      Mental Status: He is alert and oriented to person, place, and time.   Psychiatric:         Mood and Affect: Mood normal.         Behavior: Behavior normal.          ASSESSMENT/PLAN:  1. Puncture wound of left foot excluding toes with infection, initial encounter    Antibiotic as prescribed  Follow-up if no improvement  Keep clean and dry  --HILARY Madden - NP

## 2024-01-17 ENCOUNTER — OFFICE VISIT (OUTPATIENT)
Age: 69
End: 2024-01-17
Payer: COMMERCIAL

## 2024-01-17 VITALS — WEIGHT: 189.2 LBS | BODY MASS INDEX: 26.49 KG/M2 | HEIGHT: 71 IN

## 2024-01-17 DIAGNOSIS — H93.13 TINNITUS OF BOTH EARS: ICD-10-CM

## 2024-01-17 DIAGNOSIS — E78.2 MIXED HYPERLIPIDEMIA: ICD-10-CM

## 2024-01-17 DIAGNOSIS — E11.65 TYPE 2 DIABETES MELLITUS WITH HYPERGLYCEMIA, WITHOUT LONG-TERM CURRENT USE OF INSULIN (HCC): Primary | ICD-10-CM

## 2024-01-17 LAB — HBA1C MFR BLD: 7.3 %

## 2024-01-17 PROCEDURE — 1123F ACP DISCUSS/DSCN MKR DOCD: CPT | Performed by: GENERAL ACUTE CARE HOSPITAL

## 2024-01-17 PROCEDURE — 83036 HEMOGLOBIN GLYCOSYLATED A1C: CPT | Performed by: GENERAL ACUTE CARE HOSPITAL

## 2024-01-17 PROCEDURE — 99204 OFFICE O/P NEW MOD 45 MIN: CPT | Performed by: GENERAL ACUTE CARE HOSPITAL

## 2024-01-17 RX ORDER — SEMAGLUTIDE 0.68 MG/ML
INJECTION, SOLUTION SUBCUTANEOUS
Qty: 9 ML | Refills: 5 | Status: SHIPPED | OUTPATIENT
Start: 2024-01-17

## 2024-01-17 NOTE — PATIENT INSTRUCTIONS
PLAN FOR TODAY    We will plan to make the following changes to your diabetes medications:  Metformin 500mg 2 tab twice daily   Start Ozempic 0.25mg weekly     There are some foods that can worsen the possible side effects of Ozempic.  Foods that can make side effects worse include:    High-fat foods  Fried foods  High-sugar food and drink    If you experience nausea or other gastrointestinal side effects when taking Ozempic, some strategies may help:    Avoid high-fat and high-sugar food/drink  Eat bland, low-fat foods (like crackers, toast, or rice), less spicy foods  Eat more slowly  Eat foods high in water, like soups and gelatin  Drink clear or ice-cold beverages  Don’t lie down immediately after eating     It will be important to continue checking your glucose just as you did previously.   I would like you at the very least to check you glucose during:    AM fasting before breakfast  Any other time that you are not feeling well.     Always provide a glucose log that is completed at every visit so that we can review the results of your home glucose together. Without this, it is not possible to make accurate changes to your diabetes regimen.    MD [facility]     Diabetes and Meal Planning    Meal planning can be a key part of managing diabetes. Planning meals and snacks with the right balance of carbohydrate, protein, and fat can help you keep your blood sugar at the target level.  You don't have to eat special foods. You can eat what your family eats, including sweets once in a while. But you do have to pay attention to how often you eat and how much you eat of certain foods.    Your plate  The plate format is a simple way to help you manage how you eat. You plan meals by learning how much space each food should take on a plate. It can make it easier to keep your blood sugar level within your target range. It also helps you see if you're eating healthy portion sizes.  To use

## 2024-01-17 NOTE — PROGRESS NOTES
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         ASSESSMENT AND PLAN:     Type 2 diabetes Mellitus, uncontrolled     Discussed the details of diabetes mellitus including pathophysiology and diabetes care and importance of achieving target blood sugar numbers for a goal a1c of less than 7%    We discussed the possibility of using GLP-1 agonists in the treatment of their diabetes which would potentially help to control their prandial blood sugars, as this is an area in which they need to improve. The patient denies any history of pancreatitis or thyroid cancer. We discussed the possibility of experiencing a little nausea during the first week or two of use which will likely resolve. Patient is interested to trial this as part of their regimen.     Medications:    Metformin 500mg 2 tab twice daily   Start Ozempic 0.25mg weekly     Check blood sugar 1 times per day    Discussed hypoglycemia management  Annual Ophthalm, Regular foot self checks and regular dental care  Discussed important lifestyle aspects and importance of staying active    Periph neuropathy, obtain vit b12 level    Mixed Hyperlipidemia  Currently taking simvastatin - 40 MG  obtain labs  Lab Results   Component Value Date/Time    CHOL 111 08/25/2023 08:18 AM    CHOL 166 10/18/2022 10:06 AM    TRIG 71 08/25/2023 08:18 AM    LDLCALC 58 08/25/2023 08:18 AM    HDL 38 08/25/2023 08:18 AM       Hypertension  on losartan - 50 MG    Microalbumin:   Lab Results   Component Value Date    MALBCR 4 08/25/2023    MALBCR 4 10/18/2022      Blood pressure is well controlled today, cont current meds    Tinnitus of both ears   Referral to ENT, to go to ED if accompanied by

## 2024-01-25 ENCOUNTER — TELEPHONE (OUTPATIENT)
Age: 69
End: 2024-01-25

## 2024-01-25 NOTE — TELEPHONE ENCOUNTER
Reason for call:  TC from pt. Pt id verified. Pt states Ms. Amador gave him an antibiotic for an infected puncture wound on his left foot. Pt states he has finished the antibiotic and the wound looks no better and is inquiring if Ms. Amador can call him in another round of antibiotics. Pt states he is a diabetic.     Is this a new problem: No    Date of last appointment:  1/16/2024     Can we respond via [employer]: No    Best call back number: [phone]

## 2024-01-25 NOTE — TELEPHONE ENCOUNTER
Per NP office note pt to return to office for appt if no improvement.  Please schedule for this week w/ anyone.

## 2024-01-26 ENCOUNTER — PATIENT MESSAGE (OUTPATIENT)
Age: 69
End: 2024-01-26

## 2024-01-26 RX ORDER — DOXYCYCLINE HYCLATE 100 MG
100 TABLET ORAL 2 TIMES DAILY
Qty: 20 TABLET | Refills: 0 | Status: SHIPPED | OUTPATIENT
Start: 2024-01-26 | End: 2024-02-05

## 2024-01-26 NOTE — TELEPHONE ENCOUNTER
Reason for call:  TC from pt. Pt id verified. Pt calling back re vm left by PSR yesterday. Pt states he does not understand why he has to come back in to have his leg looked at when he was just here. Pt states all he wants is another antibiotic. Pt states its nonsense for him to return again. PSR did offer appt for today. Pt states he wants to speak with a nurse to discuss problem.     Is this a new problem: No    Date of last appointment:  1/16/2024     Can we respond via [employer]: No    Best call back number: [phone]

## 2024-01-29 ENCOUNTER — TELEPHONE (OUTPATIENT)
Age: 69
End: 2024-01-29

## 2024-01-29 NOTE — TELEPHONE ENCOUNTER
Reason for call:  pt returning call to Gracie Casiano please call back    Is this a new problem: Yes    Date of last appointment:  1/16/2024     Can we respond via [employer]: No    Best call back number:     [phone] (Home)

## 2024-01-29 NOTE — TELEPHONE ENCOUNTER
Reason for call:  TC from pt. Pt id verified. Pt states he is returning nurse call.     Is this a new problem: No    Date of last appointment:  1/16/2024     Can we respond via [employer]: No    Best call back number: [phone]

## 2024-01-30 NOTE — TELEPHONE ENCOUNTER
Reason for call:  TC from pt. Pt id verified. Pt states he is returning nurse call from yesterday.     Is this a new problem: No    Date of last appointment:  1/16/2024     Can we respond via [employer]: No    Best call back number: [phone]

## 2024-02-11 ENCOUNTER — HOSPITAL ENCOUNTER (EMERGENCY)
Facility: HOSPITAL | Age: 69
Discharge: HOME | End: 2024-02-11
Attending: EMERGENCY MEDICINE
Payer: COMMERCIAL

## 2024-02-11 VITALS
DIASTOLIC BLOOD PRESSURE: 81 MMHG | SYSTOLIC BLOOD PRESSURE: 149 MMHG | TEMPERATURE: 98 F | BODY MASS INDEX: 27.03 KG/M2 | OXYGEN SATURATION: 97 % | WEIGHT: 193.78 LBS | HEART RATE: 71 BPM | RESPIRATION RATE: 18 BRPM

## 2024-02-11 DIAGNOSIS — S61.411A LACERATION OF RIGHT HAND WITHOUT FOREIGN BODY, INITIAL ENCOUNTER: Primary | ICD-10-CM

## 2024-02-11 PROCEDURE — 96372 THER/PROPH/DIAG INJ SC/IM: CPT

## 2024-02-11 PROCEDURE — 90714 TD VACC NO PRESV 7 YRS+ IM: CPT | Performed by: FAMILY MEDICINE

## 2024-02-11 PROCEDURE — 6370000000 HC RX 637 (ALT 250 FOR IP): Performed by: FAMILY MEDICINE

## 2024-02-11 PROCEDURE — 90471 IMMUNIZATION ADMIN: CPT | Performed by: FAMILY MEDICINE

## 2024-02-11 PROCEDURE — 6360000002 HC RX W HCPCS: Performed by: FAMILY MEDICINE

## 2024-02-11 PROCEDURE — 2500000003 HC RX 250 WO HCPCS: Performed by: FAMILY MEDICINE

## 2024-02-11 PROCEDURE — 99284 EMERGENCY DEPT VISIT MOD MDM: CPT

## 2024-02-11 PROCEDURE — 90471 IMMUNIZATION ADMIN: CPT

## 2024-02-11 PROCEDURE — 12001 RPR S/N/AX/GEN/TRNK 2.5CM/<: CPT

## 2024-02-11 RX ORDER — LIDOCAINE HYDROCHLORIDE 10 MG/ML
5 INJECTION, SOLUTION EPIDURAL; INFILTRATION; INTRACAUDAL; PERINEURAL
Status: COMPLETED | OUTPATIENT
Start: 2024-02-11 | End: 2024-02-11

## 2024-02-11 RX ORDER — GINSENG 100 MG
CAPSULE ORAL
Status: COMPLETED | OUTPATIENT
Start: 2024-02-11 | End: 2024-02-11

## 2024-02-11 RX ORDER — CEPHALEXIN 500 MG/1
500 CAPSULE ORAL 4 TIMES DAILY
Qty: 28 CAPSULE | Refills: 0 | Status: SHIPPED | OUTPATIENT
Start: 2024-02-11 | End: 2024-02-18

## 2024-02-11 RX ORDER — TETANUS AND DIPHTHERIA TOXOIDS ADSORBED 2; 2 [LF]/.5ML; [LF]/.5ML
0.5 INJECTION INTRAMUSCULAR ONCE
Status: COMPLETED | OUTPATIENT
Start: 2024-02-11 | End: 2024-02-11

## 2024-02-11 RX ADMIN — Medication 1 EACH: at 15:17

## 2024-02-11 RX ADMIN — TETANUS AND DIPHTHERIA TOXOIDS ADSORBED 0.5 ML: 2; 2 INJECTION INTRAMUSCULAR at 15:16

## 2024-02-11 RX ADMIN — LIDOCAINE HYDROCHLORIDE 5 ML: 10 INJECTION, SOLUTION EPIDURAL; INFILTRATION; INTRACAUDAL; PERINEURAL at 15:18

## 2024-02-11 NOTE — ED TRIAGE NOTES
Pt reports cutting himself on his right palm while working on a chair today. Bleeding controlled. Last tetanus 2019.    none...

## 2024-02-11 NOTE — ED PROVIDER NOTES
Cervical back: Neck supple.   Skin:     General: Skin is warm and dry.      Capillary Refill: Capillary refill takes less than 2 seconds.   Neurological:      General: No focal deficit present.      Mental Status: He is alert and oriented to person, place, and time. Mental status is at baseline.   Psychiatric:         Mood and Affect: Mood normal.         Behavior: Behavior normal.           EMERGENCY DEPARTMENT COURSE and DIFFERENTIAL DIAGNOSIS/MDM:   Vitals:  There were no vitals filed for this visit.        Medical Decision Making  Patient presenting with right hand laceration.  Physical exam revealing pleasant, well-appearing male in no acute distress.  Laceration appears superficial in nature, no evidence of underlying fracture, tendon damage, or foreign body.  Laceration repaired at bedside with sutures.  Educated patient on care of sutures.  Will need suture removal in 1 week.  Will cover with course of Keflex.  Discussed my clinical impression(s), any labs and/or radiology results with the patient. I answered any questions and addressed any concerns. Discussed the importance of following up with their primary care physician and/or specialist(s). Discussed signs or symptoms that would warrant return back to the ER for further evaluation. The patient is agreeable with discharge.    PROCEDURE NOTE - LACERATION REPAIR:  3:41 PM  Procedure by Tamra Pantoja  Complexity: simple   1.5cm linear laceration to hand  was irrigated copiously with NS under jet lavage, cleaned with soap and water and draped in a sterile fashion.  The area was anesthetized via local infiltration of 2 mL Lidocaine 1%.  The wound was explored with the following results: No foreign bodies found.  The wound was repaired with One layer suture closure: Skin Layer:  4 sutures placed, stitch type:simple interrupted, suture: 4-0 nylon..  The wound was closed with good hemostasis and approximation.  Sterile dressing applied.  Estimated blood

## 2024-03-08 PROBLEM — E11.65 TYPE 2 DIABETES MELLITUS WITH HYPERGLYCEMIA, WITHOUT LONG-TERM CURRENT USE OF INSULIN (HCC): Status: ACTIVE | Noted: 2022-10-18

## 2024-03-08 PROBLEM — H93.13 TINNITUS OF BOTH EARS: Status: ACTIVE | Noted: 2024-03-08

## 2024-04-03 NOTE — TELEPHONE ENCOUNTER
Pt last seen on 12/13/23. Due to return in months.    Has appt on 9/3/24.    Rx last filled on 9/11/23 #360/3RF.

## 2024-04-17 DIAGNOSIS — E11.65 TYPE 2 DIABETES MELLITUS WITH HYPERGLYCEMIA, WITHOUT LONG-TERM CURRENT USE OF INSULIN (HCC): ICD-10-CM

## 2024-05-03 DIAGNOSIS — E11.9 TYPE 2 DIABETES MELLITUS WITHOUT COMPLICATIONS (HCC): ICD-10-CM

## 2024-05-03 RX ORDER — SIMVASTATIN 40 MG
40 TABLET ORAL NIGHTLY
Qty: 90 TABLET | Refills: 0 | Status: SHIPPED | OUTPATIENT
Start: 2024-05-03

## 2024-05-03 NOTE — TELEPHONE ENCOUNTER
Pt last seen on 8/24/23.    Has appt on 9/3/24.    Rx last filled on   11/27/23 Simvastatin 40 mg #90/2RF  9/11/23 Metformin 500 mg #360/3    Will forward to MD for refill.

## 2024-06-04 LAB
CHOLEST SERPL-MCNC: 128 MG/DL (ref 100–199)
HBA1C MFR BLD: 6.8 % (ref 4.8–5.6)
HDLC SERPL-MCNC: 56 MG/DL
IMP & REVIEW OF LAB RESULTS: NORMAL
LDLC SERPL CALC-MCNC: 55 MG/DL (ref 0–99)
Lab: NORMAL
TRIGL SERPL-MCNC: 87 MG/DL (ref 0–149)
TSH SERPL DL<=0.005 MIU/L-ACNC: 1.62 UIU/ML (ref 0.45–4.5)
VLDLC SERPL CALC-MCNC: 17 MG/DL (ref 5–40)

## 2024-06-05 LAB — VIT B12 SERPL-MCNC: 401 PG/ML (ref 232–1245)

## 2024-06-13 ENCOUNTER — OFFICE VISIT (OUTPATIENT)
Age: 69
End: 2024-06-13
Payer: COMMERCIAL

## 2024-06-13 VITALS
SYSTOLIC BLOOD PRESSURE: 124 MMHG | HEIGHT: 71 IN | DIASTOLIC BLOOD PRESSURE: 73 MMHG | WEIGHT: 198.6 LBS | BODY MASS INDEX: 27.8 KG/M2 | HEART RATE: 76 BPM

## 2024-06-13 DIAGNOSIS — E11.9 TYPE 2 DIABETES MELLITUS WITHOUT COMPLICATION, WITHOUT LONG-TERM CURRENT USE OF INSULIN (HCC): Primary | ICD-10-CM

## 2024-06-13 DIAGNOSIS — E66.3 OVERWEIGHT: ICD-10-CM

## 2024-06-13 PROCEDURE — 3078F DIAST BP <80 MM HG: CPT | Performed by: GENERAL ACUTE CARE HOSPITAL

## 2024-06-13 PROCEDURE — 1123F ACP DISCUSS/DSCN MKR DOCD: CPT | Performed by: GENERAL ACUTE CARE HOSPITAL

## 2024-06-13 PROCEDURE — 99214 OFFICE O/P EST MOD 30 MIN: CPT | Performed by: GENERAL ACUTE CARE HOSPITAL

## 2024-06-13 PROCEDURE — 3044F HG A1C LEVEL LT 7.0%: CPT | Performed by: GENERAL ACUTE CARE HOSPITAL

## 2024-06-13 PROCEDURE — 3074F SYST BP LT 130 MM HG: CPT | Performed by: GENERAL ACUTE CARE HOSPITAL

## 2024-06-13 RX ORDER — SEMAGLUTIDE 0.68 MG/ML
INJECTION, SOLUTION SUBCUTANEOUS
Qty: 9 ML | Refills: 5 | Status: SHIPPED | OUTPATIENT
Start: 2024-06-13

## 2024-06-13 NOTE — PROGRESS NOTES
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         ASSESSMENT AND PLAN:     Type 2 diabetes Mellitus, well controlled   Complications: Periph neuropathy  History of [facility]    Discussed the details of diabetes mellitus including pathophysiology and diabetes care and importance of achieving target blood sugar numbers for a goal a1c of less than 7%    Abdominal ultrasound in 2022 showed likely MASH, plan to increase dose of Ozempic 0.5mg weekly     Medications:    Metformin 500mg 2 tab twice daily   Ozempic 0.5mg weekly     Check blood sugar 1 times per day    Discussed hypoglycemia management  Annual Ophthalm, Regular foot self checks and regular dental care  Discussed important lifestyle aspects and importance of staying active    Periph neuropathy, stable, Vitamin B 12 within normal limits    Mixed Hyperlipidemia  Currently taking simvastatin - 40 MG  labs within normal limits   Lab Results   Component Value Date/Time    CHOL 128 06/03/2024 09:06 AM    TRIG 87 06/03/2024 09:06 AM    LDL 55 06/03/2024 09:06 AM    LDL 58 08/25/2023 08:18 AM    HDL 56 06/03/2024 09:06 AM         Hypertension  on losartan - 50 MG    Microalbumin:   Lab Results   Component Value Date    LABALBU 3.2 08/25/2023    MALBCR 4 08/25/2023    MALBCR 4 10/18/2022      Blood pressure is well controlled today, continue current meds      Component      Latest Ref Rng 6/3/2024   Cholesterol, Total      100 - 199 mg/dL 128    Triglycerides      0 - 149 mg/dL 87    HDL Cholesterol      >39 mg/dL 56    VLDL      5 - 40 mg/dL 17    LDL Cholesterol      0 - 99 mg/dL 55    Hemoglobin A1C      4.8 - 5.6 % 6.8 (H)    TSH      0.450 - 4.500 uIU/mL 1.620    Vitamin B-12      232 - 1245 pg/mL 401       Legend:  (H) High    Overweight  Increased Ozempic

## 2024-07-01 NOTE — TELEPHONE ENCOUNTER
Pt last seen by pcp on 8/24/23. Due to return in 3 months.  Seen by Chato for pre op 10/20/23    Has appt on 9/3/24.    Rx last filled on 9/11/23 #360/3RF.

## 2024-07-27 DIAGNOSIS — I10 PRIMARY HYPERTENSION: ICD-10-CM

## 2024-07-29 RX ORDER — LOSARTAN POTASSIUM 50 MG/1
50 TABLET ORAL DAILY
Qty: 90 TABLET | Refills: 0 | Status: SHIPPED | OUTPATIENT
Start: 2024-07-29

## 2024-07-30 DIAGNOSIS — K21.9 GASTRO-ESOPHAGEAL REFLUX DISEASE WITHOUT ESOPHAGITIS: ICD-10-CM

## 2024-07-30 RX ORDER — PANTOPRAZOLE SODIUM 40 MG/1
40 TABLET, DELAYED RELEASE ORAL DAILY
Qty: 90 TABLET | Refills: 3 | OUTPATIENT
Start: 2024-07-30

## 2024-07-30 NOTE — TELEPHONE ENCOUNTER
Pt last seen on 8/24/23.    Has appt on 9/3/24.    Rx last filled on:  9/11/23 Metformin #360/3RF  9/25/23 Protonix #90/3RF

## 2024-08-29 DIAGNOSIS — K21.9 GASTRO-ESOPHAGEAL REFLUX DISEASE WITHOUT ESOPHAGITIS: ICD-10-CM

## 2024-08-29 RX ORDER — PANTOPRAZOLE SODIUM 40 MG/1
40 TABLET, DELAYED RELEASE ORAL DAILY
Qty: 90 TABLET | Refills: 0 | Status: SHIPPED | OUTPATIENT
Start: 2024-08-29

## 2024-10-07 SDOH — HEALTH STABILITY: PHYSICAL HEALTH: ON AVERAGE, HOW MANY DAYS PER WEEK DO YOU ENGAGE IN MODERATE TO STRENUOUS EXERCISE (LIKE A BRISK WALK)?: 2 DAYS

## 2024-10-07 SDOH — ECONOMIC STABILITY: FOOD INSECURITY: WITHIN THE PAST 12 MONTHS, YOU WORRIED THAT YOUR FOOD WOULD RUN OUT BEFORE YOU GOT MONEY TO BUY MORE.: NEVER TRUE

## 2024-10-07 SDOH — HEALTH STABILITY: PHYSICAL HEALTH: ON AVERAGE, HOW MANY MINUTES DO YOU ENGAGE IN EXERCISE AT THIS LEVEL?: 60 MIN

## 2024-10-07 SDOH — ECONOMIC STABILITY: TRANSPORTATION INSECURITY
IN THE PAST 12 MONTHS, HAS LACK OF TRANSPORTATION KEPT YOU FROM MEETINGS, WORK, OR FROM GETTING THINGS NEEDED FOR DAILY LIVING?: NO

## 2024-10-07 SDOH — ECONOMIC STABILITY: FOOD INSECURITY: WITHIN THE PAST 12 MONTHS, THE FOOD YOU BOUGHT JUST DIDN'T LAST AND YOU DIDN'T HAVE MONEY TO GET MORE.: NEVER TRUE

## 2024-10-07 SDOH — ECONOMIC STABILITY: INCOME INSECURITY: HOW HARD IS IT FOR YOU TO PAY FOR THE VERY BASICS LIKE FOOD, HOUSING, MEDICAL CARE, AND HEATING?: NOT HARD AT ALL

## 2024-10-07 ASSESSMENT — LIFESTYLE VARIABLES
HOW MANY STANDARD DRINKS CONTAINING ALCOHOL DO YOU HAVE ON A TYPICAL DAY: 1 OR 2
HOW OFTEN DO YOU HAVE A DRINK CONTAINING ALCOHOL: 4
HOW OFTEN DO YOU HAVE A DRINK CONTAINING ALCOHOL: 2-3 TIMES A WEEK
HOW OFTEN DO YOU HAVE SIX OR MORE DRINKS ON ONE OCCASION: 1
HOW MANY STANDARD DRINKS CONTAINING ALCOHOL DO YOU HAVE ON A TYPICAL DAY: 1

## 2024-10-07 ASSESSMENT — PATIENT HEALTH QUESTIONNAIRE - PHQ9
SUM OF ALL RESPONSES TO PHQ QUESTIONS 1-9: 0
SUM OF ALL RESPONSES TO PHQ9 QUESTIONS 1 & 2: 0
SUM OF ALL RESPONSES TO PHQ QUESTIONS 1-9: 0
SUM OF ALL RESPONSES TO PHQ QUESTIONS 1-9: 0
2. FEELING DOWN, DEPRESSED OR HOPELESS: NOT AT ALL
SUM OF ALL RESPONSES TO PHQ QUESTIONS 1-9: 0
1. LITTLE INTEREST OR PLEASURE IN DOING THINGS: NOT AT ALL

## 2024-10-09 ENCOUNTER — OFFICE VISIT (OUTPATIENT)
Age: 69
End: 2024-10-09
Payer: COMMERCIAL

## 2024-10-09 VITALS
WEIGHT: 197.6 LBS | RESPIRATION RATE: 20 BRPM | HEART RATE: 83 BPM | BODY MASS INDEX: 27.66 KG/M2 | OXYGEN SATURATION: 98 % | TEMPERATURE: 97.5 F | HEIGHT: 71 IN | SYSTOLIC BLOOD PRESSURE: 130 MMHG | DIASTOLIC BLOOD PRESSURE: 77 MMHG

## 2024-10-09 DIAGNOSIS — I10 PRIMARY HYPERTENSION: ICD-10-CM

## 2024-10-09 DIAGNOSIS — K21.9 GASTROESOPHAGEAL REFLUX DISEASE WITHOUT ESOPHAGITIS: ICD-10-CM

## 2024-10-09 DIAGNOSIS — E11.9 TYPE 2 DIABETES MELLITUS WITHOUT COMPLICATIONS (HCC): ICD-10-CM

## 2024-10-09 DIAGNOSIS — Z00.00 ROUTINE GENERAL MEDICAL EXAMINATION AT A HEALTH CARE FACILITY: ICD-10-CM

## 2024-10-09 DIAGNOSIS — M54.50 LUMBAR BACK PAIN: ICD-10-CM

## 2024-10-09 DIAGNOSIS — Z00.00 ROUTINE GENERAL MEDICAL EXAMINATION AT A HEALTH CARE FACILITY: Primary | ICD-10-CM

## 2024-10-09 DIAGNOSIS — E78.2 MIXED HYPERLIPIDEMIA: ICD-10-CM

## 2024-10-09 DIAGNOSIS — Z12.5 PROSTATE CANCER SCREENING: ICD-10-CM

## 2024-10-09 DIAGNOSIS — N18.31 STAGE 3A CHRONIC KIDNEY DISEASE (HCC): ICD-10-CM

## 2024-10-09 DIAGNOSIS — E11.9 TYPE 2 DIABETES MELLITUS WITHOUT COMPLICATION, WITHOUT LONG-TERM CURRENT USE OF INSULIN (HCC): ICD-10-CM

## 2024-10-09 PROBLEM — B02.29 POSTHERPETIC NEURALGIA: Status: RESOLVED | Noted: 2022-10-18 | Resolved: 2024-10-09

## 2024-10-09 PROCEDURE — 3075F SYST BP GE 130 - 139MM HG: CPT | Performed by: STUDENT IN AN ORGANIZED HEALTH CARE EDUCATION/TRAINING PROGRAM

## 2024-10-09 PROCEDURE — 99397 PER PM REEVAL EST PAT 65+ YR: CPT | Performed by: STUDENT IN AN ORGANIZED HEALTH CARE EDUCATION/TRAINING PROGRAM

## 2024-10-09 PROCEDURE — 3078F DIAST BP <80 MM HG: CPT | Performed by: STUDENT IN AN ORGANIZED HEALTH CARE EDUCATION/TRAINING PROGRAM

## 2024-10-09 RX ORDER — SIMVASTATIN 40 MG
40 TABLET ORAL NIGHTLY
Qty: 90 TABLET | Refills: 1 | Status: SHIPPED | OUTPATIENT
Start: 2024-10-09

## 2024-10-09 NOTE — TELEPHONE ENCOUNTER
Pt last seen on 10/9/24. Due to return in one year.    Rx last filled on  #90/0RF.    Rx sent to pharmacy as #90/1RF and verified by Verbal Order Read Back with provider.

## 2024-10-09 NOTE — PROGRESS NOTES
room and forget what he needed to do. This is stable over the last year, no worse. He tells me sleep is good. He is not under any increased stress at work.     HTN  - taking losartan  - home BP low 120s/60s    DM  - now followed by endo Dr Mesa  - metformin, ozempic   - simvastatin     GERD  - pantoprazole     Low back pain - lumbar spinal stenosis, spondyloithesis, R lumbar radiculitis  - [facility]o [facility]  - lyrica, robaxin  - he had surgery 11/2023 - L4-S1 fusion     CKD3 - borderline. He stopped taking NSAIDS.      Diet: prioritizes protein and veggies, keeping carbs to a minimum. Eats 2 meals a day, sometimes 3rd meal is a struggle and he will eat protein bar or shake. He does travel quite a bit for business or pleasure.     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        PMHx    Patient Active Problem List   Diagnosis    Type 2 diabetes mellitus without complication, without long-term current use of insulin (HCC)    Arthritis    Mixed hyperlipidemia    Primary hypertension    Gastroesophageal reflux disease without esophagitis    Fatty liver    Lumbar back pain    Tinnitus of both ears    Overweight       Prior to Admission medications    Medication Sig Start Date End Date Taking? Authorizing Provider   metFORMIN (GLUCOPHAGE) 500 MG tablet TAKE 2 TABLETS BY MOUTH TWO (2) TIMES DAILY (WITH MEALS). 8/30/24  Yes Amira Mesa MD   pantoprazole (PROTONIX) 40 MG tablet TAKE 1 TABLET BY MOUTH EVERY DAY 8/29/24  Yes Gricelda Schmidt MD   losartan (COZAAR) 50 MG tablet TAKE 1 TABLET BY MOUTH EVERY DAY 7/29/24  Yes Gricelda Schmidt MD   OZEMPIC, 0.25 OR 0.5 MG/DOSE, 2 MG/3ML SOPN 0.5mg every 7 days 6/13/24  Yes Amira Mesa MD   simvastatin (ZOCOR) 40 MG tablet TAKE 1 TABLET BY MOUTH EVERY DAY AT NIGHT 5/3/24  Yes Gricelda Schmidt MD   Omega-3 Fatty Acids (FISH OIL) 1000 MG capsule Take 2 capsules by mouth 2 times daily 2

## 2024-10-09 NOTE — ASSESSMENT & PLAN NOTE
Now established with Sole Mesa. Taking metformin and ozempic. He does note appetite suppression with ozempic but usually able to eat 2-3 meals/day.

## 2024-10-10 LAB
ALBUMIN SERPL-MCNC: 4.5 G/DL (ref 3.9–4.9)
ALP SERPL-CCNC: 62 IU/L (ref 44–121)
ALT SERPL-CCNC: 17 IU/L (ref 0–44)
AST SERPL-CCNC: 19 IU/L (ref 0–40)
BILIRUB SERPL-MCNC: 0.4 MG/DL (ref 0–1.2)
BUN SERPL-MCNC: 19 MG/DL (ref 8–27)
BUN/CREAT SERPL: 18 (ref 10–24)
CALCIUM SERPL-MCNC: 9.4 MG/DL (ref 8.6–10.2)
CHLORIDE SERPL-SCNC: 101 MMOL/L (ref 96–106)
CO2 SERPL-SCNC: 24 MMOL/L (ref 20–29)
CREAT SERPL-MCNC: 1.03 MG/DL (ref 0.76–1.27)
EGFRCR SERPLBLD CKD-EPI 2021: 80 ML/MIN/1.73
ERYTHROCYTE [DISTWIDTH] IN BLOOD BY AUTOMATED COUNT: 13.1 % (ref 11.6–15.4)
GLOBULIN SER CALC-MCNC: 1.8 G/DL (ref 1.5–4.5)
GLUCOSE SERPL-MCNC: 119 MG/DL (ref 70–99)
HCT VFR BLD AUTO: 41.6 % (ref 37.5–51)
HGB BLD-MCNC: 13.2 G/DL (ref 13–17.7)
MCH RBC QN AUTO: 29.3 PG (ref 26.6–33)
MCHC RBC AUTO-ENTMCNC: 31.7 G/DL (ref 31.5–35.7)
MCV RBC AUTO: 92 FL (ref 79–97)
PLATELET # BLD AUTO: 160 X10E3/UL (ref 150–450)
POTASSIUM SERPL-SCNC: 5.1 MMOL/L (ref 3.5–5.2)
PROT SERPL-MCNC: 6.3 G/DL (ref 6–8.5)
PSA SERPL-MCNC: 1.5 NG/ML (ref 0–4)
RBC # BLD AUTO: 4.51 X10E6/UL (ref 4.14–5.8)
SODIUM SERPL-SCNC: 140 MMOL/L (ref 134–144)
WBC # BLD AUTO: 5.8 X10E3/UL (ref 3.4–10.8)

## 2024-10-16 DIAGNOSIS — K21.9 GASTRO-ESOPHAGEAL REFLUX DISEASE WITHOUT ESOPHAGITIS: ICD-10-CM

## 2024-10-16 RX ORDER — PANTOPRAZOLE SODIUM 40 MG/1
40 TABLET, DELAYED RELEASE ORAL DAILY
Qty: 90 TABLET | Refills: 3 | Status: SHIPPED | OUTPATIENT
Start: 2024-10-16

## 2024-10-16 NOTE — TELEPHONE ENCOUNTER
Pt last seen on 10/9/24. Due to return in one year.    Has no appt scheduled at this time. Will forward to front office pool to call pt.    Rx last filled on 8/29/24 #90/0RF.    Rx sent to pharmacy as #90/3 and verified by Verbal Order Read Back with provider.

## 2024-10-17 ENCOUNTER — TELEPHONE (OUTPATIENT)
Age: 69
End: 2024-10-17

## 2024-10-17 NOTE — TELEPHONE ENCOUNTER
I spoke with the patient, and he is experiencing constant sickness due to Ozempic. He discussed his symptoms with his PCP, Dr. Schmidt, who suggested switching to Jardiance.  The patient is having persistent diarrhea, an upset stomach, and is unable to eat certain foods, including eggs. He is also losing his appetite.  Please advise on the next steps for addressing his medication change.     Thank you!

## 2024-10-18 ENCOUNTER — PATIENT MESSAGE (OUTPATIENT)
Age: 69
End: 2024-10-18

## 2024-10-18 DIAGNOSIS — I10 PRIMARY HYPERTENSION: ICD-10-CM

## 2024-10-18 RX ORDER — LOSARTAN POTASSIUM 50 MG/1
50 TABLET ORAL DAILY
Qty: 90 TABLET | Refills: 3 | Status: SHIPPED | OUTPATIENT
Start: 2024-10-18

## 2024-10-18 RX ORDER — EMPAGLIFLOZIN 10 MG/1
TABLET, FILM COATED ORAL
Qty: 90 TABLET | Refills: 1 | Status: SHIPPED | OUTPATIENT
Start: 2024-10-18

## 2024-10-18 NOTE — TELEPHONE ENCOUNTER
Discussed with mr Horta over the phone, only received call yesterday and MyChart message today, plan is to switch to Jardiance and he will let us know if his symptoms have improved, patient indicates understanding and agrees with plan, he will let us know if he continues to have any issues

## 2025-02-04 ENCOUNTER — TELEPHONE (OUTPATIENT)
Age: 70
End: 2025-02-04

## 2025-02-04 NOTE — TELEPHONE ENCOUNTER
----- Message from Terry WINCHESTER sent at 2/4/2025  2:54 PM EST -----  Regarding: [facility] Appointment Request  [facility] Appointment Request    Patient needs appointment for [facility] Appointment Type: Annual Visit.    Patient Requested Dates(s): August 12, 2025  Patient Requested Time: 10:00 AM  Provider Name: Gricelda Schmidt MD      Reason for Appointment Request: Established Patient - Available appointments did not meet patient need  --------------------------------------------------------------------------------------------------------------------------    Relationship to Patient: Spouse/Partner Nicole Jefferson -wife     Call Back Information: OK to leave message on voicemail  Preferred Call Back Number: Phone [phone] (home)

## 2025-02-06 ENCOUNTER — TELEPHONE (OUTPATIENT)
Age: 70
End: 2025-02-06

## 2025-02-06 NOTE — TELEPHONE ENCOUNTER
Received record request from [facility].  Called to verify with patient that it is ok to send records.    LMTCB with instructions to ask for Xenia.

## 2025-02-06 NOTE — TELEPHONE ENCOUNTER
Pt returned called.  Discussed records request.  Pt was still unclear what it was regarding.  Sent pt a copy of the record request via [facility] to review and then provide instructions on whether or not to send records.

## 2025-02-07 ENCOUNTER — TELEPHONE (OUTPATIENT)
Age: 70
End: 2025-02-07

## 2025-02-07 ENCOUNTER — OFFICE VISIT (OUTPATIENT)
Age: 70
End: 2025-02-07
Payer: COMMERCIAL

## 2025-02-07 VITALS
RESPIRATION RATE: 16 BRPM | DIASTOLIC BLOOD PRESSURE: 84 MMHG | OXYGEN SATURATION: 98 % | SYSTOLIC BLOOD PRESSURE: 138 MMHG | HEIGHT: 71 IN | TEMPERATURE: 97.6 F | HEART RATE: 72 BPM | BODY MASS INDEX: 28.06 KG/M2 | WEIGHT: 200.4 LBS

## 2025-02-07 DIAGNOSIS — T14.8XXA SPLINTER IN SKIN: Primary | ICD-10-CM

## 2025-02-07 PROCEDURE — 99213 OFFICE O/P EST LOW 20 MIN: CPT | Performed by: STUDENT IN AN ORGANIZED HEALTH CARE EDUCATION/TRAINING PROGRAM

## 2025-02-07 PROCEDURE — 1123F ACP DISCUSS/DSCN MKR DOCD: CPT | Performed by: STUDENT IN AN ORGANIZED HEALTH CARE EDUCATION/TRAINING PROGRAM

## 2025-02-07 PROCEDURE — 3075F SYST BP GE 130 - 139MM HG: CPT | Performed by: STUDENT IN AN ORGANIZED HEALTH CARE EDUCATION/TRAINING PROGRAM

## 2025-02-07 PROCEDURE — 3079F DIAST BP 80-89 MM HG: CPT | Performed by: STUDENT IN AN ORGANIZED HEALTH CARE EDUCATION/TRAINING PROGRAM

## 2025-02-07 SDOH — ECONOMIC STABILITY: FOOD INSECURITY: WITHIN THE PAST 12 MONTHS, YOU WORRIED THAT YOUR FOOD WOULD RUN OUT BEFORE YOU GOT MONEY TO BUY MORE.: NEVER TRUE

## 2025-02-07 SDOH — ECONOMIC STABILITY: FOOD INSECURITY: WITHIN THE PAST 12 MONTHS, THE FOOD YOU BOUGHT JUST DIDN'T LAST AND YOU DIDN'T HAVE MONEY TO GET MORE.: NEVER TRUE

## 2025-02-07 ASSESSMENT — PATIENT HEALTH QUESTIONNAIRE - PHQ9
SUM OF ALL RESPONSES TO PHQ QUESTIONS 1-9: 0
SUM OF ALL RESPONSES TO PHQ QUESTIONS 1-9: 0
1. LITTLE INTEREST OR PLEASURE IN DOING THINGS: NOT AT ALL
SUM OF ALL RESPONSES TO PHQ9 QUESTIONS 1 & 2: 0
SUM OF ALL RESPONSES TO PHQ QUESTIONS 1-9: 0
SUM OF ALL RESPONSES TO PHQ QUESTIONS 1-9: 0
2. FEELING DOWN, DEPRESSED OR HOPELESS: NOT AT ALL

## 2025-02-07 NOTE — PROGRESS NOTES
Mychal Jefferson is a 70 y.o. male who was seen in clinic today (2/7/2025) for an acute visit.     Assessment & Plan:   Below is the assessment and plan developed based on review of pertinent history, physical exam, labs, studies, and medications.    1. Splinter in skin  Ball of left foot. I attempted to remove with tweezers. His skin was very soft due to soaking. I was able to get through some of the superficial layers of skin but I was not able to remove the splinter.   He will continue to soak foot in epson bath and use topical neosporin to prevent infection.   I attempted to make a podiatry appointment but [facility] was not answering the phone and [facility] closed at this hour. He will call back on Monday if not improved and we can try calling podiatry again on his behalf.     Subjective:   Mychal was seen today for Other (Infection in his toe and or foot /Last Friday )    Splinter in L ball of foot x1 week. Unable to remove on his own. Hurts when he steps on the ball of the foot. Has been soaking in epson salt bath and applying topical neosporin to prevent infection.     Patient Active Problem List   Diagnosis    Type 2 diabetes mellitus without complication, without long-term current use of insulin (HCC)    Arthritis    Mixed hyperlipidemia    Primary hypertension    Gastroesophageal reflux disease without esophagitis    Fatty liver    Lumbar back pain    Tinnitus of both ears    Overweight       Prior to Admission medications    Medication Sig Start Date End Date Taking? Authorizing Provider   Omega-3 Fatty Acids (FISH OIL) 1000 MG capsule Take 2 capsules by mouth 2 times daily 2 tabs bid 12/18/24  Yes Gricelda Schmidt MD   JARDIANCE 10 MG tablet One tablet by mouth once a day 10/18/24  Yes Amira Mesa MD   losartan (COZAAR) 50 MG tablet TAKE 1 TABLET BY MOUTH EVERY DAY 10/18/24  Yes Gricelda Schmidt MD   pantoprazole (PROTONIX) 40 MG tablet TAKE 1 TABLET BY MOUTH EVERY DAY

## 2025-02-10 ENCOUNTER — TELEPHONE (OUTPATIENT)
Age: 70
End: 2025-02-10

## 2025-02-10 NOTE — TELEPHONE ENCOUNTER
[facility]ed [facility] - spoke with Tamra. Requesting appt for pt with hx of diabetes. Has splinter in ball of left foot which could not be removed at appt on 2/7/25. Pt scheduled today at 2:25 pm with Dr Mathur.      Called pt - advised him of appt info and gave address.

## 2025-04-08 DIAGNOSIS — E11.9 TYPE 2 DIABETES MELLITUS WITHOUT COMPLICATION, WITHOUT LONG-TERM CURRENT USE OF INSULIN: Primary | ICD-10-CM

## 2025-04-08 RX ORDER — EMPAGLIFLOZIN 10 MG/1
10 TABLET, FILM COATED ORAL DAILY
Qty: 90 TABLET | Refills: 1 | OUTPATIENT
Start: 2025-04-08

## 2025-04-09 DIAGNOSIS — E11.9 TYPE 2 DIABETES MELLITUS WITHOUT COMPLICATIONS: ICD-10-CM

## 2025-04-09 RX ORDER — SIMVASTATIN 40 MG
40 TABLET ORAL NIGHTLY
Qty: 90 TABLET | Refills: 1 | Status: SHIPPED | OUTPATIENT
Start: 2025-04-09

## 2025-04-09 NOTE — TELEPHONE ENCOUNTER
Last office visit 2/7/25  Next Appt  With Internal Medicine (Gricelda Schmidt MD)  10/13/2025 at 9:00 AM    Last elpidio on simvastatin 10/9/24  10/9/24 #90 with 1 additional refill

## 2025-04-10 RX ORDER — EMPAGLIFLOZIN 10 MG/1
TABLET, FILM COATED ORAL
Qty: 90 TABLET | Refills: 3 | Status: SHIPPED | OUTPATIENT
Start: 2025-04-10

## 2025-04-21 ENCOUNTER — OFFICE VISIT (OUTPATIENT)
Age: 70
End: 2025-04-21
Payer: MEDICARE

## 2025-04-21 VITALS
DIASTOLIC BLOOD PRESSURE: 70 MMHG | SYSTOLIC BLOOD PRESSURE: 133 MMHG | WEIGHT: 191.4 LBS | HEIGHT: 71 IN | BODY MASS INDEX: 26.8 KG/M2 | HEART RATE: 69 BPM

## 2025-04-21 DIAGNOSIS — E66.3 OVERWEIGHT: ICD-10-CM

## 2025-04-21 DIAGNOSIS — E11.42 DIABETIC POLYNEUROPATHY ASSOCIATED WITH TYPE 2 DIABETES MELLITUS (HCC): ICD-10-CM

## 2025-04-21 DIAGNOSIS — E78.2 MIXED HYPERLIPIDEMIA: ICD-10-CM

## 2025-04-21 DIAGNOSIS — R41.3 MEMORY DEFICIT: ICD-10-CM

## 2025-04-21 DIAGNOSIS — E11.9 TYPE 2 DIABETES MELLITUS WITHOUT COMPLICATION, WITHOUT LONG-TERM CURRENT USE OF INSULIN (HCC): Primary | ICD-10-CM

## 2025-04-21 LAB — HBA1C MFR BLD: 6.7 %

## 2025-04-21 PROCEDURE — 2022F DILAT RTA XM EVC RTNOPTHY: CPT | Performed by: GENERAL ACUTE CARE HOSPITAL

## 2025-04-21 PROCEDURE — 3017F COLORECTAL CA SCREEN DOC REV: CPT | Performed by: GENERAL ACUTE CARE HOSPITAL

## 2025-04-21 PROCEDURE — 3078F DIAST BP <80 MM HG: CPT | Performed by: GENERAL ACUTE CARE HOSPITAL

## 2025-04-21 PROCEDURE — G8419 CALC BMI OUT NRM PARAM NOF/U: HCPCS | Performed by: GENERAL ACUTE CARE HOSPITAL

## 2025-04-21 PROCEDURE — 1160F RVW MEDS BY RX/DR IN RCRD: CPT | Performed by: GENERAL ACUTE CARE HOSPITAL

## 2025-04-21 PROCEDURE — 3075F SYST BP GE 130 - 139MM HG: CPT | Performed by: GENERAL ACUTE CARE HOSPITAL

## 2025-04-21 PROCEDURE — PBSHW AMB POC HEMOGLOBIN A1C: Performed by: GENERAL ACUTE CARE HOSPITAL

## 2025-04-21 PROCEDURE — 83036 HEMOGLOBIN GLYCOSYLATED A1C: CPT | Performed by: GENERAL ACUTE CARE HOSPITAL

## 2025-04-21 PROCEDURE — 1036F TOBACCO NON-USER: CPT | Performed by: GENERAL ACUTE CARE HOSPITAL

## 2025-04-21 PROCEDURE — G2211 COMPLEX E/M VISIT ADD ON: HCPCS | Performed by: GENERAL ACUTE CARE HOSPITAL

## 2025-04-21 PROCEDURE — 1159F MED LIST DOCD IN RCRD: CPT | Performed by: GENERAL ACUTE CARE HOSPITAL

## 2025-04-21 PROCEDURE — 99214 OFFICE O/P EST MOD 30 MIN: CPT | Performed by: GENERAL ACUTE CARE HOSPITAL

## 2025-04-21 PROCEDURE — 1123F ACP DISCUSS/DSCN MKR DOCD: CPT | Performed by: GENERAL ACUTE CARE HOSPITAL

## 2025-04-21 PROCEDURE — 3046F HEMOGLOBIN A1C LEVEL >9.0%: CPT | Performed by: GENERAL ACUTE CARE HOSPITAL

## 2025-04-21 PROCEDURE — G8427 DOCREV CUR MEDS BY ELIG CLIN: HCPCS | Performed by: GENERAL ACUTE CARE HOSPITAL

## 2025-04-21 PROCEDURE — 1126F AMNT PAIN NOTED NONE PRSNT: CPT | Performed by: GENERAL ACUTE CARE HOSPITAL

## 2025-04-21 PROCEDURE — 3044F HG A1C LEVEL LT 7.0%: CPT | Performed by: GENERAL ACUTE CARE HOSPITAL

## 2025-04-21 RX ORDER — EMPAGLIFLOZIN AND METFORMIN HYDROCHLORIDE 5; 1000 MG/1; MG/1
TABLET ORAL
Qty: 180 TABLET | Refills: 3 | Status: SHIPPED | OUTPATIENT
Start: 2025-04-21

## 2025-04-21 NOTE — PATIENT INSTRUCTIONS
PLAN FOR TODAY    We will plan to make the following changes to your diabetes medications:    Stop metformin and Jardiance  START Synjardy 5-1000 mg twice daily with meals    It will be important to continue checking your glucose just as you did previously.   I would like you at the very least to check you glucose during:    AM fasting before breakfast  Any other time that you are not feeling well.     Always provide a glucose log that is completed at every visit so that we can review the results of your home glucose together. Without this, it is not possible to make accurate changes to your diabetes regimen.    MD [facility]     ---------------------------------------------------------------------------                For more food/recipe information:    American Diabetes Association website: https://www.diabetesfoodhub.org    [employer]ia[employer] : https://diatribe.org/diabetes-recipes

## 2025-05-23 ENCOUNTER — TELEPHONE (OUTPATIENT)
Age: 70
End: 2025-05-23

## 2025-05-23 DIAGNOSIS — K21.9 GASTRO-ESOPHAGEAL REFLUX DISEASE WITHOUT ESOPHAGITIS: ICD-10-CM

## 2025-05-23 DIAGNOSIS — E11.9 TYPE 2 DIABETES MELLITUS WITHOUT COMPLICATIONS (HCC): ICD-10-CM

## 2025-05-23 DIAGNOSIS — I10 PRIMARY HYPERTENSION: ICD-10-CM

## 2025-05-23 RX ORDER — LOSARTAN POTASSIUM 50 MG/1
TABLET ORAL
Qty: 90 TABLET | Refills: 0 | OUTPATIENT
Start: 2025-05-23

## 2025-05-23 RX ORDER — SIMVASTATIN 40 MG
TABLET ORAL
Qty: 90 TABLET | Refills: 0 | OUTPATIENT
Start: 2025-05-23

## 2025-05-23 RX ORDER — PANTOPRAZOLE SODIUM 40 MG/1
TABLET, DELAYED RELEASE ORAL
Qty: 90 TABLET | Refills: 0 | OUTPATIENT
Start: 2025-05-23

## 2025-05-27 ENCOUNTER — TELEPHONE (OUTPATIENT)
Age: 70
End: 2025-05-27

## 2025-05-27 RX ORDER — SIMVASTATIN 40 MG
40 TABLET ORAL NIGHTLY
Qty: 90 TABLET | Refills: 1 | Status: SHIPPED | OUTPATIENT
Start: 2025-05-27

## 2025-05-27 RX ORDER — LOSARTAN POTASSIUM 50 MG/1
50 TABLET ORAL DAILY
Qty: 90 TABLET | Refills: 1 | Status: SHIPPED | OUTPATIENT
Start: 2025-05-27

## 2025-05-27 RX ORDER — PANTOPRAZOLE SODIUM 40 MG/1
40 TABLET, DELAYED RELEASE ORAL DAILY
Qty: 90 TABLET | Refills: 3 | Status: SHIPPED | OUTPATIENT
Start: 2025-05-27

## 2025-05-27 NOTE — TELEPHONE ENCOUNTER
Pt called and LVM 5/27 @ 10:15 am    Pt stated he is trying to get a hold of the nurse to find out if he can change his medication to something that is accepted better by medicare.    Pt# [phone]

## 2025-05-28 NOTE — TELEPHONE ENCOUNTER
Pt stated he could not get the alternative medication information from his insurance and would like to know what can he be prescribed.

## 2025-05-28 NOTE — TELEPHONE ENCOUNTER
Spoke to the pt and he stated Synjardy is not covered by his insurance. Informed the pt to contact his insurance company to find out the alternative and to call us back with the information so we can send in the covered alternative.

## 2025-05-30 ENCOUNTER — TELEPHONE (OUTPATIENT)
Age: 70
End: 2025-05-30

## 2025-05-30 RX ORDER — EMPAGLIFLOZIN AND METFORMIN HYDROCHLORIDE 5; 1000 MG/1; MG/1
TABLET ORAL
Qty: 180 TABLET | Refills: 3 | Status: SHIPPED | OUTPATIENT
Start: 2025-05-30

## 2025-05-30 NOTE — TELEPHONE ENCOUNTER
Patient states that Jardiance and Synjardy are too expensive ($2000/year). Metformin he can get for free.  He had called his insurance company to find out what is less expensive. They would not tell him.  Wonders what else he can take that would be less expensive to take with Metformin.  Patient can be reached at [phone].

## 2025-06-01 RX ORDER — EMPAGLIFLOZIN, METFORMIN HYDROCHLORIDE 12.5; 1 MG/1; MG/1
TABLET, EXTENDED RELEASE ORAL
Qty: 28 TABLET | Refills: 0 | Status: SHIPPED | COMMUNITY
Start: 2025-06-01 | End: 2025-06-01

## 2025-06-01 RX ORDER — GLIPIZIDE 2.5 MG/1
TABLET, EXTENDED RELEASE ORAL
Qty: 90 TABLET | Refills: 1 | Status: SHIPPED | OUTPATIENT
Start: 2025-06-01

## 2025-06-01 RX ORDER — EMPAGLIFLOZIN, METFORMIN HYDROCHLORIDE 12.5; 1 MG/1; MG/1
TABLET, EXTENDED RELEASE ORAL
Qty: 28 TABLET | Refills: 0 | Status: SHIPPED | COMMUNITY
Start: 2025-06-01

## 2025-06-04 RX ORDER — GLIPIZIDE 2.5 MG/1
TABLET, EXTENDED RELEASE ORAL
Qty: 90 TABLET | Refills: 1 | Status: SHIPPED | OUTPATIENT
Start: 2025-06-04

## 2025-06-04 RX ORDER — EMPAGLIFLOZIN AND METFORMIN HYDROCHLORIDE 5; 1000 MG/1; MG/1
TABLET ORAL
Qty: 180 TABLET | Refills: 1 | Status: ACTIVE | OUTPATIENT
Start: 2025-06-04

## 2025-06-04 NOTE — TELEPHONE ENCOUNTER
Spoke to the pt and informed him of the messages from Dr. Mesa. Pt will be contacting his insurance to see if Farxiga is covered.

## 2025-06-04 NOTE — TELEPHONE ENCOUNTER
Requested Prescriptions     Pending Prescriptions Disp Refills    SYNJARDY 5-1000 MG TABS 180 tablet 3     Sig: Take 1 tab with breakfast and dinner    glipiZIDE (GLUCOTROL XL) 2.5 MG extended release tablet 90 tablet 1     Sig: One tablet by mouth once a day

## 2025-08-25 ENCOUNTER — OFFICE VISIT (OUTPATIENT)
Age: 70
End: 2025-08-25
Payer: MEDICARE

## 2025-08-25 VITALS
WEIGHT: 188.6 LBS | TEMPERATURE: 97.1 F | HEIGHT: 71 IN | DIASTOLIC BLOOD PRESSURE: 62 MMHG | OXYGEN SATURATION: 95 % | HEART RATE: 77 BPM | SYSTOLIC BLOOD PRESSURE: 95 MMHG | RESPIRATION RATE: 17 BRPM | BODY MASS INDEX: 26.4 KG/M2

## 2025-08-25 DIAGNOSIS — R07.89 CHEST WALL PAIN: ICD-10-CM

## 2025-08-25 DIAGNOSIS — Z12.5 PROSTATE CANCER SCREENING: ICD-10-CM

## 2025-08-25 DIAGNOSIS — I10 PRIMARY HYPERTENSION: ICD-10-CM

## 2025-08-25 DIAGNOSIS — E11.9 TYPE 2 DIABETES MELLITUS WITHOUT COMPLICATION, WITHOUT LONG-TERM CURRENT USE OF INSULIN (HCC): Primary | ICD-10-CM

## 2025-08-25 DIAGNOSIS — R91.1 PULMONARY NODULE: ICD-10-CM

## 2025-08-25 PROCEDURE — G8427 DOCREV CUR MEDS BY ELIG CLIN: HCPCS | Performed by: STUDENT IN AN ORGANIZED HEALTH CARE EDUCATION/TRAINING PROGRAM

## 2025-08-25 PROCEDURE — 1160F RVW MEDS BY RX/DR IN RCRD: CPT | Performed by: STUDENT IN AN ORGANIZED HEALTH CARE EDUCATION/TRAINING PROGRAM

## 2025-08-25 PROCEDURE — 99214 OFFICE O/P EST MOD 30 MIN: CPT | Performed by: STUDENT IN AN ORGANIZED HEALTH CARE EDUCATION/TRAINING PROGRAM

## 2025-08-25 PROCEDURE — 3044F HG A1C LEVEL LT 7.0%: CPT | Performed by: STUDENT IN AN ORGANIZED HEALTH CARE EDUCATION/TRAINING PROGRAM

## 2025-08-25 PROCEDURE — 1159F MED LIST DOCD IN RCRD: CPT | Performed by: STUDENT IN AN ORGANIZED HEALTH CARE EDUCATION/TRAINING PROGRAM

## 2025-08-25 PROCEDURE — 1125F AMNT PAIN NOTED PAIN PRSNT: CPT | Performed by: STUDENT IN AN ORGANIZED HEALTH CARE EDUCATION/TRAINING PROGRAM

## 2025-08-25 PROCEDURE — 2022F DILAT RTA XM EVC RTNOPTHY: CPT | Performed by: STUDENT IN AN ORGANIZED HEALTH CARE EDUCATION/TRAINING PROGRAM

## 2025-08-25 PROCEDURE — 3046F HEMOGLOBIN A1C LEVEL >9.0%: CPT | Performed by: STUDENT IN AN ORGANIZED HEALTH CARE EDUCATION/TRAINING PROGRAM

## 2025-08-25 PROCEDURE — 3074F SYST BP LT 130 MM HG: CPT | Performed by: STUDENT IN AN ORGANIZED HEALTH CARE EDUCATION/TRAINING PROGRAM

## 2025-08-25 PROCEDURE — 3078F DIAST BP <80 MM HG: CPT | Performed by: STUDENT IN AN ORGANIZED HEALTH CARE EDUCATION/TRAINING PROGRAM

## 2025-08-25 PROCEDURE — 1036F TOBACCO NON-USER: CPT | Performed by: STUDENT IN AN ORGANIZED HEALTH CARE EDUCATION/TRAINING PROGRAM

## 2025-08-25 PROCEDURE — 3017F COLORECTAL CA SCREEN DOC REV: CPT | Performed by: STUDENT IN AN ORGANIZED HEALTH CARE EDUCATION/TRAINING PROGRAM

## 2025-08-25 PROCEDURE — 1123F ACP DISCUSS/DSCN MKR DOCD: CPT | Performed by: STUDENT IN AN ORGANIZED HEALTH CARE EDUCATION/TRAINING PROGRAM

## 2025-08-25 PROCEDURE — G8419 CALC BMI OUT NRM PARAM NOF/U: HCPCS | Performed by: STUDENT IN AN ORGANIZED HEALTH CARE EDUCATION/TRAINING PROGRAM

## 2025-08-25 RX ORDER — METFORMIN HYDROCHLORIDE 500 MG/1
1000 TABLET, EXTENDED RELEASE ORAL
Qty: 120 TABLET | Refills: 0 | Status: SHIPPED | OUTPATIENT
Start: 2025-08-25 | End: 2025-09-24

## 2025-08-25 RX ORDER — METFORMIN HYDROCHLORIDE 500 MG/1
1000 TABLET, EXTENDED RELEASE ORAL
Qty: 360 TABLET | Refills: 1 | Status: SHIPPED | OUTPATIENT
Start: 2025-08-25

## 2025-08-27 ENCOUNTER — APPOINTMENT (OUTPATIENT)
Facility: HOSPITAL | Age: 70
End: 2025-08-27
Payer: MEDICARE

## 2025-08-27 ENCOUNTER — HOSPITAL ENCOUNTER (EMERGENCY)
Facility: HOSPITAL | Age: 70
Discharge: HOME | End: 2025-08-27
Attending: EMERGENCY MEDICINE
Payer: MEDICARE

## 2025-08-27 ENCOUNTER — TELEPHONE (OUTPATIENT)
Age: 70
End: 2025-08-27

## 2025-08-27 VITALS
DIASTOLIC BLOOD PRESSURE: 66 MMHG | HEART RATE: 84 BPM | TEMPERATURE: 98.9 F | BODY MASS INDEX: 25.4 KG/M2 | SYSTOLIC BLOOD PRESSURE: 117 MMHG | WEIGHT: 181.44 LBS | HEIGHT: 71 IN | RESPIRATION RATE: 18 BRPM | OXYGEN SATURATION: 95 %

## 2025-08-27 DIAGNOSIS — B34.9 ACUTE VIRAL SYNDROME: Primary | ICD-10-CM

## 2025-08-27 LAB
ALBUMIN SERPL-MCNC: 3.5 G/DL (ref 3.5–5)
ALBUMIN/GLOB SERPL: 1 (ref 1.1–2.2)
ALP SERPL-CCNC: 153 U/L (ref 45–117)
ALT SERPL-CCNC: 50 U/L (ref 12–78)
ANION GAP SERPL CALC-SCNC: 11 MMOL/L (ref 2–12)
AST SERPL-CCNC: 41 U/L (ref 15–37)
BASOPHILS # BLD: 0.02 K/UL (ref 0–0.1)
BASOPHILS NFR BLD: 0.6 % (ref 0–1)
BILIRUB SERPL-MCNC: 0.6 MG/DL (ref 0.2–1)
BUN SERPL-MCNC: 26 MG/DL (ref 6–20)
BUN/CREAT SERPL: 19 (ref 12–20)
CALCIUM SERPL-MCNC: 8.6 MG/DL (ref 8.5–10.1)
CHLORIDE SERPL-SCNC: 97 MMOL/L (ref 97–108)
CO2 SERPL-SCNC: 24 MMOL/L (ref 21–32)
CREAT SERPL-MCNC: 1.34 MG/DL (ref 0.7–1.3)
DIFFERENTIAL METHOD BLD: ABNORMAL
EKG ATRIAL RATE: 95 BPM
EKG DIAGNOSIS: NORMAL
EKG P AXIS: 34 DEGREES
EKG P-R INTERVAL: 126 MS
EKG Q-T INTERVAL: 334 MS
EKG QRS DURATION: 88 MS
EKG QTC CALCULATION (BAZETT): 419 MS
EKG R AXIS: 7 DEGREES
EKG T AXIS: 49 DEGREES
EKG VENTRICULAR RATE: 95 BPM
EOSINOPHIL # BLD: 0.03 K/UL (ref 0–0.4)
EOSINOPHIL NFR BLD: 0.8 % (ref 0–7)
ERYTHROCYTE [DISTWIDTH] IN BLOOD BY AUTOMATED COUNT: 14.2 % (ref 11.5–14.5)
FLUAV RNA SPEC QL NAA+PROBE: NOT DETECTED
FLUBV RNA SPEC QL NAA+PROBE: NOT DETECTED
GLOBULIN SER CALC-MCNC: 3.4 G/DL (ref 2–4)
GLUCOSE SERPL-MCNC: 184 MG/DL (ref 65–100)
HCT VFR BLD AUTO: 41 % (ref 36.6–50.3)
HGB BLD-MCNC: 13.9 G/DL (ref 12.1–17)
IMM GRANULOCYTES # BLD AUTO: 0.01 K/UL (ref 0–0.04)
IMM GRANULOCYTES NFR BLD AUTO: 0.3 % (ref 0–0.5)
LACTATE SERPL-SCNC: 1.5 MMOL/L (ref 0.4–2)
LYMPHOCYTES # BLD: 0.34 K/UL (ref 0.8–3.5)
LYMPHOCYTES NFR BLD: 9.4 % (ref 12–49)
MCH RBC QN AUTO: 28.3 PG (ref 26–34)
MCHC RBC AUTO-ENTMCNC: 33.9 G/DL (ref 30–36.5)
MCV RBC AUTO: 83.3 FL (ref 80–99)
MONOCYTES # BLD: 0.37 K/UL (ref 0–1)
MONOCYTES NFR BLD: 10.2 % (ref 5–13)
NEUTS SEG # BLD: 2.83 K/UL (ref 1.8–8)
NEUTS SEG NFR BLD: 78.7 % (ref 32–75)
NRBC # BLD: 0 K/UL (ref 0–0.01)
NRBC BLD-RTO: 0 PER 100 WBC
PLATELET # BLD AUTO: 88 K/UL (ref 150–400)
PMV BLD AUTO: 11.9 FL (ref 8.9–12.9)
POTASSIUM SERPL-SCNC: 3.9 MMOL/L (ref 3.5–5.1)
PROCALCITONIN SERPL-MCNC: 1.1 NG/ML
PROT SERPL-MCNC: 6.9 G/DL (ref 6.4–8.2)
RBC # BLD AUTO: 4.92 M/UL (ref 4.1–5.7)
RBC MORPH BLD: ABNORMAL
S PYO DNA THROAT QL NAA+PROBE: NOT DETECTED
SARS-COV-2 RNA RESP QL NAA+PROBE: NOT DETECTED
SODIUM SERPL-SCNC: 132 MMOL/L (ref 136–145)
SOURCE: NORMAL
TROPONIN I SERPL HS-MCNC: 13 NG/L (ref 0–76)
WBC # BLD AUTO: 3.6 K/UL (ref 4.1–11.1)

## 2025-08-27 PROCEDURE — 87651 STREP A DNA AMP PROBE: CPT

## 2025-08-27 PROCEDURE — 71045 X-RAY EXAM CHEST 1 VIEW: CPT

## 2025-08-27 PROCEDURE — 85025 COMPLETE CBC W/AUTO DIFF WBC: CPT

## 2025-08-27 PROCEDURE — 99285 EMERGENCY DEPT VISIT HI MDM: CPT

## 2025-08-27 PROCEDURE — 93005 ELECTROCARDIOGRAM TRACING: CPT | Performed by: PHYSICIAN ASSISTANT

## 2025-08-27 PROCEDURE — 84145 PROCALCITONIN (PCT): CPT

## 2025-08-27 PROCEDURE — 2580000003 HC RX 258: Performed by: PHYSICIAN ASSISTANT

## 2025-08-27 PROCEDURE — 80053 COMPREHEN METABOLIC PANEL: CPT

## 2025-08-27 PROCEDURE — 84484 ASSAY OF TROPONIN QUANT: CPT

## 2025-08-27 PROCEDURE — 87636 SARSCOV2 & INF A&B AMP PRB: CPT

## 2025-08-27 PROCEDURE — 36415 COLL VENOUS BLD VENIPUNCTURE: CPT

## 2025-08-27 PROCEDURE — 6370000000 HC RX 637 (ALT 250 FOR IP): Performed by: PHYSICIAN ASSISTANT

## 2025-08-27 PROCEDURE — 96360 HYDRATION IV INFUSION INIT: CPT

## 2025-08-27 PROCEDURE — 87040 BLOOD CULTURE FOR BACTERIA: CPT

## 2025-08-27 PROCEDURE — 83605 ASSAY OF LACTIC ACID: CPT

## 2025-08-27 RX ORDER — 0.9 % SODIUM CHLORIDE 0.9 %
1000 INTRAVENOUS SOLUTION INTRAVENOUS ONCE
Status: COMPLETED | OUTPATIENT
Start: 2025-08-27 | End: 2025-08-27

## 2025-08-27 RX ORDER — ACETAMINOPHEN 500 MG
1000 TABLET ORAL
Status: COMPLETED | OUTPATIENT
Start: 2025-08-27 | End: 2025-08-27

## 2025-08-27 RX ORDER — ONDANSETRON 4 MG/1
4 TABLET, FILM COATED ORAL 3 TIMES DAILY PRN
Qty: 15 TABLET | Refills: 0 | Status: SHIPPED | OUTPATIENT
Start: 2025-08-27

## 2025-08-27 RX ORDER — ACETAMINOPHEN 500 MG
1000 TABLET ORAL 3 TIMES DAILY PRN
Qty: 180 TABLET | Refills: 0 | Status: SHIPPED | OUTPATIENT
Start: 2025-08-27

## 2025-08-27 RX ORDER — ONDANSETRON 4 MG/1
4 TABLET, ORALLY DISINTEGRATING ORAL ONCE
Status: DISCONTINUED | OUTPATIENT
Start: 2025-08-27 | End: 2025-08-27 | Stop reason: HOSPADM

## 2025-08-27 RX ADMIN — ACETAMINOPHEN 1000 MG: 500 TABLET ORAL at 18:12

## 2025-08-27 RX ADMIN — SODIUM CHLORIDE 1000 ML: 0.9 INJECTION, SOLUTION INTRAVENOUS at 18:10

## 2025-08-27 ASSESSMENT — PAIN SCALES - GENERAL: PAINLEVEL_OUTOF10: 6

## 2025-08-31 LAB
BACTERIA SPEC CULT: NORMAL
BACTERIA SPEC CULT: NORMAL
SERVICE CMNT-IMP: NORMAL
SERVICE CMNT-IMP: NORMAL
